# Patient Record
Sex: FEMALE | Race: WHITE
[De-identification: names, ages, dates, MRNs, and addresses within clinical notes are randomized per-mention and may not be internally consistent; named-entity substitution may affect disease eponyms.]

---

## 2017-03-05 ENCOUNTER — HOSPITAL ENCOUNTER (INPATIENT)
Dept: HOSPITAL 53 - M ED | Age: 82
LOS: 1 days | Discharge: ANOTHER HOSPITAL | DRG: 69 | End: 2017-03-06
Attending: HOSPITALIST | Admitting: HOSPITALIST
Payer: MEDICARE

## 2017-03-05 VITALS — BODY MASS INDEX: 32.37 KG/M2 | WEIGHT: 189.6 LBS | HEIGHT: 64 IN

## 2017-03-05 DIAGNOSIS — I44.2: ICD-10-CM

## 2017-03-05 DIAGNOSIS — Z79.82: ICD-10-CM

## 2017-03-05 DIAGNOSIS — Z79.84: ICD-10-CM

## 2017-03-05 DIAGNOSIS — E03.9: ICD-10-CM

## 2017-03-05 DIAGNOSIS — I21.4: ICD-10-CM

## 2017-03-05 DIAGNOSIS — Z79.891: ICD-10-CM

## 2017-03-05 DIAGNOSIS — Z79.4: ICD-10-CM

## 2017-03-05 DIAGNOSIS — I35.1: ICD-10-CM

## 2017-03-05 DIAGNOSIS — R26.81: ICD-10-CM

## 2017-03-05 DIAGNOSIS — E11.9: ICD-10-CM

## 2017-03-05 DIAGNOSIS — I10: ICD-10-CM

## 2017-03-05 DIAGNOSIS — F41.9: ICD-10-CM

## 2017-03-05 DIAGNOSIS — Z79.899: ICD-10-CM

## 2017-03-05 DIAGNOSIS — Z95.0: ICD-10-CM

## 2017-03-05 DIAGNOSIS — Z85.3: ICD-10-CM

## 2017-03-05 DIAGNOSIS — E78.5: ICD-10-CM

## 2017-03-05 DIAGNOSIS — G45.9: Primary | ICD-10-CM

## 2017-03-05 LAB
ANION GAP SERPL CALC-SCNC: 9 MEQ/L (ref 8–16)
BASOPHILS # BLD AUTO: 0 K/MM3 (ref 0–0.2)
BASOPHILS NFR BLD AUTO: 0.6 % (ref 0–1)
BUN SERPL-MCNC: 22 MG/DL (ref 7–18)
CALCIUM SERPL-MCNC: 9.4 MG/DL (ref 8.8–10.2)
CHLORIDE SERPL-SCNC: 104 MEQ/L (ref 98–107)
CO2 SERPL-SCNC: 29 MEQ/L (ref 21–32)
CREAT SERPL-MCNC: 1.24 MG/DL (ref 0.55–1.02)
EOSINOPHIL # BLD AUTO: 0.2 K/MM3 (ref 0–0.5)
EOSINOPHIL NFR BLD AUTO: 2.8 % (ref 0–3)
ERYTHROCYTE [DISTWIDTH] IN BLOOD BY AUTOMATED COUNT: 13.7 % (ref 11.5–14.5)
GFR SERPL CREATININE-BSD FRML MDRD: 44.3 ML/MIN/{1.73_M2} (ref 32–?)
GLUCOSE SERPL-MCNC: 163 MG/DL (ref 83–110)
INR PPP: 0.94
LARGE UNSTAINED CELL #: 0.1 K/MM3 (ref 0–0.4)
LARGE UNSTAINED CELL %: 1.1 % (ref 0–4)
LYMPHOCYTES # BLD AUTO: 1.1 K/MM3 (ref 1.5–4.5)
LYMPHOCYTES NFR BLD AUTO: 15.4 % (ref 24–44)
MCH RBC QN AUTO: 29.6 PG (ref 27–33)
MCHC RBC AUTO-ENTMCNC: 32.6 G/DL (ref 32–36.5)
MCV RBC AUTO: 90.8 FL (ref 80–96)
MONOCYTES # BLD AUTO: 0.3 K/MM3 (ref 0–0.8)
MONOCYTES NFR BLD AUTO: 3.6 % (ref 0–5)
NEUTROPHILS # BLD AUTO: 5.3 K/MM3 (ref 1.8–7.7)
NEUTROPHILS NFR BLD AUTO: 76.5 % (ref 36–66)
PLATELET # BLD AUTO: 200 K/MM3 (ref 150–450)
POTASSIUM SERPL-SCNC: 4.4 MEQ/L (ref 3.5–5.1)
SODIUM SERPL-SCNC: 142 MEQ/L (ref 136–145)
WBC # BLD AUTO: 6.9 K/MM3 (ref 4–10)

## 2017-03-05 RX ADMIN — Medication SCH MG: at 21:00

## 2017-03-05 NOTE — REPUSA
CLINICAL HISTORY: DYSARTHRIA.

TECHNIQUE: Multiple axial CT images were obtained through the brain without IV contrast material.

COMMENTS: 

There is normal configuration of sella turcica. There are no intra or extra-axial collections. There 
is no mass effect or midline shift. There is no evidence of hematoma formation. No hydrocephalus is p
resent. The ventricles are symmetrical. No abnormal calcifications are present. 

There is diffuse age-appropriate cerebellar and cerebral atrophy with proportionally dilated ventricl
es and cortical sulci. 

There are bilateral periventricular and subcortical white matter hypolucencies compatible with mild c
hronic microvascular disease.

Otherwise, no significant focal abnormalities are seen either in the posterior fossa or supratentoria
l compartment.

IMPRESSION:

1. Age-appropriate cerebellar and cerebral atrophy.

2. Mild chronic microvascular disease.

3. No evidence of acute intracranial pathology. 

Thank you for your kind referral of this patient.

 

     Electronically signed by ELISA SANDHU MD on 03/05/2017 08:08:30 PM ET

## 2017-03-06 VITALS — SYSTOLIC BLOOD PRESSURE: 131 MMHG | DIASTOLIC BLOOD PRESSURE: 73 MMHG

## 2017-03-06 VITALS — SYSTOLIC BLOOD PRESSURE: 116 MMHG | DIASTOLIC BLOOD PRESSURE: 63 MMHG

## 2017-03-06 VITALS — SYSTOLIC BLOOD PRESSURE: 125 MMHG | DIASTOLIC BLOOD PRESSURE: 63 MMHG

## 2017-03-06 VITALS — SYSTOLIC BLOOD PRESSURE: 130 MMHG | DIASTOLIC BLOOD PRESSURE: 72 MMHG

## 2017-03-06 VITALS — SYSTOLIC BLOOD PRESSURE: 138 MMHG | DIASTOLIC BLOOD PRESSURE: 74 MMHG

## 2017-03-06 VITALS — SYSTOLIC BLOOD PRESSURE: 152 MMHG | DIASTOLIC BLOOD PRESSURE: 79 MMHG

## 2017-03-06 LAB
ALBUMIN SERPL BCG-MCNC: 3.5 GM/DL (ref 3.2–5.2)
ALBUMIN/GLOB SERPL: 1.03 {RATIO} (ref 1–1.93)
ALP SERPL-CCNC: 115 U/L (ref 45–117)
ALT SERPL W P-5'-P-CCNC: 29 U/L (ref 12–78)
ANION GAP SERPL CALC-SCNC: 8 MEQ/L (ref 8–16)
AST SERPL-CCNC: 18 U/L (ref 15–37)
BASE EXCESS BLDA CALC-SCNC: 3.2 MMOL/L (ref -2–2)
BASOPHILS # BLD AUTO: 0 K/MM3 (ref 0–0.2)
BASOPHILS NFR BLD AUTO: 0.4 % (ref 0–1)
BILIRUB SERPL-MCNC: 0.5 MG/DL (ref 0.2–1)
BUN SERPL-MCNC: 17 MG/DL (ref 7–18)
CALCIUM SERPL-MCNC: 8.8 MG/DL (ref 8.8–10.2)
CHLORIDE SERPL-SCNC: 104 MEQ/L (ref 98–107)
CHOLEST SERPL-MCNC: 127 MG/DL (ref ?–200)
CO2 BLDA CALC-SCNC: 28.7 MEQ/L (ref 23–31)
CO2 SERPL-SCNC: 29 MEQ/L (ref 21–32)
CREAT SERPL-MCNC: 1.17 MG/DL (ref 0.55–1.02)
EOSINOPHIL # BLD AUTO: 0.1 K/MM3 (ref 0–0.5)
EOSINOPHIL NFR BLD AUTO: 2.2 % (ref 0–3)
ERYTHROCYTE [DISTWIDTH] IN BLOOD BY AUTOMATED COUNT: 13.8 % (ref 11.5–14.5)
GFR SERPL CREATININE-BSD FRML MDRD: 47.4 ML/MIN/{1.73_M2} (ref 32–?)
GLUCOSE SERPL-MCNC: 171 MG/DL (ref 83–110)
HCO3 BLDA-SCNC: 27.5 MEQ/L (ref 22–26)
HCO3 STD BLDA-SCNC: 27.2 MEQ/L (ref 22–26)
LARGE UNSTAINED CELL #: 0.1 K/MM3 (ref 0–0.4)
LARGE UNSTAINED CELL %: 1.2 % (ref 0–4)
LYMPHOCYTES # BLD AUTO: 1.1 K/MM3 (ref 1.5–4.5)
LYMPHOCYTES NFR BLD AUTO: 16.2 % (ref 24–44)
MAGNESIUM SERPL-MCNC: 2.3 MG/DL (ref 1.8–2.4)
MCH RBC QN AUTO: 29.6 PG (ref 27–33)
MCHC RBC AUTO-ENTMCNC: 32.5 G/DL (ref 32–36.5)
MCV RBC AUTO: 91.2 FL (ref 80–96)
MONOCYTES # BLD AUTO: 0.3 K/MM3 (ref 0–0.8)
MONOCYTES NFR BLD AUTO: 5 % (ref 0–5)
NEUTROPHILS # BLD AUTO: 5 K/MM3 (ref 1.8–7.7)
NEUTROPHILS NFR BLD AUTO: 75 % (ref 36–66)
PCO2 BLDA: 40.7 MMHG (ref 35–45)
PH BLDA: 7.45 UNITS (ref 7.35–7.45)
PLATELET # BLD AUTO: 191 K/MM3 (ref 150–450)
PO2 BLDA: 67 MMHG (ref 75–100)
POTASSIUM SERPL-SCNC: 4.4 MEQ/L (ref 3.5–5.1)
PROT SERPL-MCNC: 6.9 GM/DL (ref 6.4–8.2)
SODIUM SERPL-SCNC: 141 MEQ/L (ref 136–145)
TRIGL SERPL-MCNC: 109 MG/DL (ref ?–150)
WBC # BLD AUTO: 6.6 K/MM3 (ref 4–10)

## 2017-03-06 RX ADMIN — INSULIN LISPRO SCH UNITS: 100 INJECTION, SOLUTION INTRAVENOUS; SUBCUTANEOUS at 17:35

## 2017-03-06 RX ADMIN — GABAPENTIN SCH MG: 300 CAPSULE ORAL at 17:34

## 2017-03-06 RX ADMIN — Medication SCH MG: at 08:34

## 2017-03-06 RX ADMIN — POTASSIUM CHLORIDE SCH MEQ: 750 TABLET, EXTENDED RELEASE ORAL at 02:41

## 2017-03-06 RX ADMIN — INSULIN LISPRO SCH UNITS: 100 INJECTION, SOLUTION INTRAVENOUS; SUBCUTANEOUS at 08:35

## 2017-03-06 RX ADMIN — INSULIN LISPRO SCH UNITS: 100 INJECTION, SOLUTION INTRAVENOUS; SUBCUTANEOUS at 12:17

## 2017-03-06 RX ADMIN — GABAPENTIN SCH MG: 300 CAPSULE ORAL at 08:36

## 2017-03-06 RX ADMIN — POTASSIUM CHLORIDE SCH MEQ: 750 TABLET, EXTENDED RELEASE ORAL at 08:37

## 2017-03-06 RX ADMIN — GABAPENTIN SCH MG: 300 CAPSULE ORAL at 02:41

## 2017-03-06 NOTE — REPUSA
CLINICAL HISTORY: Transient ischemic attack.

COMMENTS:

Real time sonography with duplex Doppler of the carotid arteries bilaterally is performed without keri
or studies for comparison.

Bilateral atheromatous plaques of the common carotid arteries extending to the internal and external 
carotid arteries.

Normal peak systolic velocities are noted.

Antegrade flow in the vertebral arteries.

IMPRESSION:

Bilateral less than 50% narrowing of the internal carotid arteries.

Thank you for your kind referral of this patient.

     Electronically signed by WALTER LILLY MD on 03/06/2017 12:28:25 AM ET

## 2017-03-06 NOTE — HPE
DATE OF ADMISSION:  03/05/2017

 

REASON FOR ADMISSION:  Transient ischemic attack (TIA) like symptoms.

 

PRIMARY CARE PROVIDER:  Dr. Dixon.

 

HISTORY OF PRESENT ILLNESS:  The patient is an 81-year-old female with past

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

diabetes, hypertension, and aortic regurgitation who presented to the emergency

room with her daughter after she started having TIA-like symptoms.  The patient's

daughter was over at the house to bring her father into the hospital for

shortness of breath when she noted that her mom started to have an unsteady gait

and confused speech.  She checked her blood pressure and it was found to be

elevated.  She called the ambulance and brought her mother into the hospital.  In

the emergency room, the patient underwent a CT scan which showed age appropriate

cerebral and cerebellar atrophy, mild chronic microvascular disease, no evidence

of acute pathology.  Vascular ultrasound was also done and showed bilateral less

than 50% narrowing of the internal carotids.  Blood pressure on arrival was

slightly elevated at 161/79, but had improved to 140/71 on repeat.  The

hospitalist was called for the admission for observation for possible TIA-like

symptoms.  Upon my exam, the patient did not have any confusion at this time or

slurred speech.  She had no focal deficits.  Denied any chest pain or shortness

of breath.  Denied any nausea or vomiting.  Denied any diaphoresis or chills.

Denied any urinary or bowel incontinence.  Denied any paresthesias or tingling.

 

 

REVIEW OF SYSTEMS:  12-point review of systems was obtained, all of which was

negative except for those mentioned above.

 

PAST MEDICAL HISTORY:  Significant for third degree heart block, diabetes,

hypertension, aortic regurgitation, breast cancer a year ago, hyperlipidemia,

hypothyroidism, and anxiety.

 

PAST SURGICAL HISTORY:  Significant for pacemaker placement, bilateral knee

surgery and cataract surgery.

 

ALLERGIES:  CODEINE.

 

SOCIAL HISTORY:  The patient denies any alcohol or tobacco use.

 

HOME MEDICATIONS (include):

-  Tylenol 650 mg by mouth every 4 hours as needed for pain

-  Norvasc 10 mg by mouth daily

-  aspirin 81 mg by mouth daily

-  atorvastatin 40 mg by mouth at bedtime

-  chlorthalidone 12.5 mg by mouth daily

-  gabapentin 300 mg by mouth three times a day

-  glipizide 10 mg by mouth twice a day

-  Lantus 18 units subcutaneously at night

-  irbesartan 150 mg by mouth daily

-  lorazepam 0.5 mg by mouth twice a day

-  magnesium chloride 64 mg tablet by mouth twice a day

-  metformin 500 mg by mouth daily and 1000 mg by mouth at bedtime

-  multivitamin 1 tablet by mouth daily

-  paroxetine 20 mg by mouth daily

-  potassium chloride 10 mEq by mouth twice a day

-  spironolactone 25 mg by mouth daily

 

FAMILY HISTORY:  Noncontributory.

 

PHYSICAL EXAMINATION:

Vitals:  On admission, blood pressure was 161/79, temperature 97, pulse 76,

respiratory rate 18, pulse oximetry 97% on room air.

HEENT:  Pupils equal round and react to light and accommodation.  Neck supple.

No jugular venous distention (JVD).

Lungs:  Clear to auscultation (CTA) bilaterally.

Abdomen:  Soft, nontender, nondistended.

Extremities:  No clubbing, cyanosis or edema.

Neurologic:  Cranial nerves II-XII grossly intact.  No focal deficits.

 

LABORATORY FINDINGS:  WBC 6.9, hemoglobin 12.5, hematocrit 38.1, platelet count

200.  Sodium 142, potassium 4.4, chloride 104, BUN 22, creatinine 1.24, fasting

glucose 163, INR 0.94.  CT scan as above.  Carotid ultrasound as above.

 

ASSESSMENT/PLAN:

1.  Transient ischemic attack like symptoms which had resolved by the time the

patient arrived to the emergency room.  There is no documentation of any focal

deficits.  The patient is able to move arms and legs.  No signs of facial droop.

Speech appears to be normal.  We will continue to monitor on telemetry.  The

patient was unable to get a MRI due to patient having a pacemaker.  CT scan was

unremarkable.  We will order a morning lipid panel.  Continue neurologic checks

every 4 hours.

2.  History of hypertension.  We will resume the patient's home medications.

3.  History of lower extremity edema.  Will continue the patient's

spironolactone.

4.  History of diabetes.  Will start the patient on sliding scale with Accu-Cheks

before meals and at bedtime.  Consistent carbohydrate diet.

5.  History of hyperlipidemia.  Continue Lipitor 40 mg by mouth daily.

6.  History of anxiety.  Continue the patient's home medications.

7.  Deep vein thrombosis (DVT) prophylaxis.  Sequential compression devices

(SCDs) while in bed.

## 2017-03-06 NOTE — DSES
DATE OF ADMISSION:  03/05/2017

DATE OF DISCHARGE:

 

Patient is transferred to [facility] for possible cardiac

catheterization March 6, 2017.

 

PRIMARY CARE PROVIDER: Dr. Dixon

 

NEUROLOGIST: Dr. Adams

 

CARDIOLOGIST: Dr. De Jesus

 

FINAL DIAGNOSES:

Non-ST elevation myocardial infarction (NSTEMI).

Transient ischemic attack (TIA).

Hypertension.

History of lower extremity edema.

History of diabetes, type 2.

History of dyslipidemia.

Anxiety.

 

HISTORY OF PRESENT ILLNESS: This is an 81-year-old female patient with 
underlying

medical history of third-degree heart block with pacemaker, type 2 diabetes,

hypertension, aortic regurgitation, who presented to the emergency room with her

daughter after she was having TIA symptoms. The patient's daughter, who is a

nurse, was in the process of bringing her  to the hospital for shortness

of breath and had noticed that the patient's mother was unsteady and confused

with slurred speech, with also left arm weakness. Subsequently, called ambulance

and brought her mother into the hospital. The patient underwent CT scan, which

were negative other than some age-appropriate changes and cerebral atrophy and

microvascular changes. No evidence of acute pathology. Ultrasound Doppler of the

carotids shows bilateral less than 50% narrowing of internal carotid. Blood

pressure on arrival was 161/79. The patient has subsequently improved.

Hospitalist was called for admission of the patient to the hospital. The patient

was admitted to the hospital, given aspirin, and the patient's aspirin and 
statin

was continued. Echocardiogram was ordered. Neurologic check was observed. The

patient's lab was repeated and followed. The patient's blood pressure medication

was restarted. During the day today, the patient had an episode of increasing

lethargy and confusion again. At that time, blood pressure was 90/50 and the

patient was not tachycardic with heart rate of 64. The patient was alert and

oriented at that time but was just sleepy and drowsy. Subsequently, CT head was

repeated as well as given intravenous (IV) fluids and EKG was done showing

ventricular paced rhythm and cardiac enzyme was also sent. Cardiac enzyme

returned with a troponin of 2.99. Subsequently, the case was discussed with Dr. De Jesus. Given patient with diabetes and EKG showing ventricular (v) pace, the

patient could have a silent ischemic myocardial event, given

patient did admit with TIA, repeat CT scan was negative. Case was discussed with

Dr. De Jesus and Dr. Adams. Plavix loading of 150 mg was given and the patient 
was

placed on aspirin and Plavix and Lipitor, aspirin 81 mg, Plavix 75 mg and 
Lipitor

40 mg by mouth nightly.  [facility] was contacted for

transfer. The patient's blood pressure medication was also discontinued and

adjusted. The patient's blood pressure subsequently improved ,and the patient's

mental status returned to baseline. The decision was made that the patient 
should

be transferred for possible catheterization at [facility] and recommended by

Dr. De Jesus. Subsequently, arrangements are made for the patient to be

transferred to [facility].

 

VITAL SIGNS: Temperature 97.3, pulse 64, respirations 20, blood pressure 125/63,

pulse oximetry 95% on room air.

 

LABORATORY: WBC 6.6, hemoglobin and hematocrit 12.1 over 37.2, platelets 191.

Chemistry: Sodium 141, potassium 4.4, chloride 104, bicarbonate 29, BUN 17,

creatinine 1.17, total , troponin 2.99.

 

DISCHARGE MEDICATIONS:

- Coreg 6.25 mg by mouth twice a day

- Plavix 75 mg by mouth daily

- Avapro 75 mg by mouth daily, decreased from 150 mg

- acetaminophen 650 mg by mouth nightly

- aspirin 81 mg by mouth daily

- Lipitor 40 mg by mouth daily

- vitamin D one tablet by mouth daily

- gabapentin 300 mg by mouth three times a day

- glipizide 10 mg by mouth twice a day

- Lantus insulin 18 units subcu nightly

 

- The patient was on Ativan 0.5 mg by mouth twice a day at home.

- magnesium chloride 64 mg by mouth twice a day

- metformin 500 mg by mouth daily; metformin has been on hold given possibility

of cardiac catheterization

- multivitamin one tablet by mouth daily

- Paxil 20 mg by mouth daily

- potassium chloride 10 mEq by mouth twice a day

- spironolactone 25 mg by mouth daily

 

DISCHARGE INSTRUCTIONS: The patient was instructed to followup with Cardiology 
at

[facility] for further care, followup with telemetry monitoring, neurologic

checks, further care as per cardiology and [facility], followup with primary

care provider after discharge as well as cardiology after discharge and 
neurology

after discharge.

CHARLETTE

## 2017-03-06 NOTE — REP
CT HEAD WITHOUT CONTRAST:

 

HISTORY: TIAs.

 

COMPARISON: 03/05/2017

 

Areas of decreased attenuation are present in the periventricular white matter.

This represents small vessel ischemic disease. There is no intraparenchymal

hemorrhage, mass or midline shift. The ventricular system and cortical sulci are

dilated consistent with minimal volume loss. There is no extracerebral

collection. The visualized sinuses are clear.

 

IMPRESSION:

 

1. Small vessel ischemic disease.

 

2. Minimal volume loss.

 

 

Signed by

Brandon Guy MD 03/06/2017 03:37 P

## 2017-03-06 NOTE — ECGEPIP
Stationary ECG Study

                              [facility]

                                       

                                       Test Date:    2017

Pat Name:     ELMA POSADAS             Department:   

Patient ID:   [id number]                 Room:         [place] [address]

Gender:       F                        Technician:   TREY

:          1935               Requested By: DEWEY MARROQUIN 

Order Number: [id number]     Reading MD:   Kaity Hanson

                                 Measurements

Intervals                              Axis          

Rate:         62                       P:            58

MO:           164                      QRS:          -89

QRSD:         194                      T:            44

QT:           499                                    

QTc:          510                                    

                           Interpretive Statements

ELECTRONIC VENTRICULAR PACEMAKER NSR

ABNORMAL RHYTHM ECG

MORE V PACING   NO ATRIAL PACING C/W 4/8/15

Electronically Signed On 3-6-2017 17:15:48 EST by Kaity Hanson

## 2017-03-06 NOTE — IPN
DATE:  03/06/2017

 

Time patient was seen was at 9 a.m.

 

Patient has been seen and examined at bedside.  No acute event overnight.

Patient, however does have tachycardia on the event strips.  Patient, herself

feels better.  Denies any chest pain, trouble breathing, abdominal pains, nausea
,

vomiting, diarrhea, or constipation.  Denies any weakness on any side of her

body.  Admits to numbness at her heels, which was chronic.  Denies any changes 
in

vision, smell, hearing or taste.

 

PHYSICAL EXAMINATION:

VITAL SIGNS:  Temperature 97.6, pulse 66, respirations 18, blood pressure 138/74
,

and oxygen was satting 95% on room air.

GENERAL:  Patient is a pleasant, elderly female who was alert, awake, oriented

times three, does not appear to be in distress, resting comfortably in bed with

head elevated at 45 degree angle.

HEENT:  Normocephalic, atraumatic.  Extraocular motor intact.  Mucosa moist.

NECK:  Supple.  No neck lymphadenopathy.

CARDIOVASCULAR:  Regular rate and rhythm.  S1, S2.  Patient does have a 3/6

systolic heart murmur.

LUNGS:  Clear to auscultate bilaterally.  No wheeze, rales or rhonchi.

ABDOMEN:  Positive bowel sounds.  Soft.  Nontender.  Nondistended.  No 
peritoneal

signs.  No ecchymosis.

EXTREMITIES:  No edema, clubbing or cyanosis.

SKIN:  Warm and dry.

NEUROLOGIC:  Cranial nerves II-XII intact.  Finger-to-nose was somewhat sluggish

on both side.  However, there is no change of sensation.  Muscle strength was 5/
5

in bilateral upper and lower extremity.

PSYCHIATRIC:  Normal mood and affect.

 

LABORATORY DATA:  WBC 6.6, hemoglobin 12.1, hematocrit is 37.2, with platelet

count of 191 and MCV of 91.2.

 

Sodium 141, potassium 4.4, chloride 104, bicarbonate 29, anion gap 8, BUN 17,

creatinine 1.17, GFR 47.4, fasting glucose 171, calcium 8.8, magnesium 2.3, AST

18, ALT 29, alkaline phosphatase 115, total protein 6.9, albumin 3.5,

triglyceride 109, cholesterol 127, LDL 61.2, HDL 44.

 

Patient's PT yesterday was 12.7, INR 0.94, PTT 28.1.

 

Accu-Chek glucose last night was 168.

 

Patient does have a carotid duplex ultrasound.  It shows bilateral less than 50%

narrowing of internal carotid artery.  Patient had a CT head without contrast

yesterday at 7:20.  It shows age-appropriate cerebellar and cerebral palsy.  
Mild

chronic microvascular disease.  No evidence of acute intracranial pathology.

 

ASSESSMENT AND PLAN:  81-year-old female with past medical history of third

degree heart block, diabetes, hypertension, aortic regurgitation, breast cancer 
a

year ago, hyperlipidemia, hypothyroidism, anxiety, pacemaker, presented with:

 

1.  Transient ischemic attack (TIA).  Resolved by the time patient arrived

patient arrived in the emergency room (ER).  Patient does not have any

neurological changes this morning.  CT scan of the head was unremarkable last

night.  Patient was unable to have MRI due to pacemaker.  Patient's repeat head

scan has been ordered for tomorrow.  Patient's lipid panel was within normal

limits.  Continue neuro checks.  We will also order an echo, physical therapy

(PT), occupational therapy (OT) and continue to monitor patient.

 

2.  History of hypertension.  Will continue patient's home medications.

 

3.  History of lower extremity edema.  Continue spironolactone.

 

4.  History of diabetes.  Continue patient on sliding scale with Accu-Cheks

before meals and at bedtime with consistent carbohydrate diet.

 

5.  Hyperlipidemia.  Continue Lipitor.

 

6.  History of anxiety.  Continue home medication.

 

7.  Deep vein thrombosis (DVT) prophylaxis.  Sequential compression devices

(SCDs).  Will repeat CT head tomorrow.

 

DISPOSITION:  Will followup with repeat CT head.  Will followup with echo and PT
,

OT and continue to monitor patient.  Continue neuro check.

 

Patient has been discussed with attending doctor, Dr. Montoya.

 

My preceptor for this patient encounter was Suze Montoya MD.  The preceptor was

physically present in the building during the encounter and was fully available.

As needed, all aspects of the patient interview, examination, medical decision

making process, and medical care plan development were reviewed and approved by

the preceptor.  The preceptor is aware and concurs with the plan as stated in 
the

body of this note and will attest to such by his/her co-signature.



Addendum: 

Patient was found to be suddenly hypotensive later in the afternoon. Dr. Montoya 
ordered Cardiac markers and found patient to have elevated troponin 2.9. 

Patient was then emergently transferred out for the NSTEMI



I have both independently examined this patient as well as reviewed the note.  
I have discussed in detail with the resident the findings and plan of treatment 
as documented in the residents note. I will continue to follow the patient and 
offer further guidance to the patients care as necessary during this hospital 
stay.



Suze OVALLES

## 2017-03-06 NOTE — ECGEPIP
Stationary ECG Study

                           [facility]

                                       

                                       Test Date:    2017

Pat Name:     ELMA POSADAS             Department:   

Patient ID:   [id number]                 Room:         [place] [address]

Gender:       F                        Technician:   larissa

:          1935               Requested By: PRABHA GRANDE 

Order Number: [id number]     Reading MD:   Lazaro Lopez

                                 Measurements

Intervals                              Axis          

Rate:         70                       P:            85

WV:           170                      QRS:          -89

QRSD:         194                      T:            37

QT:           468                                    

QTc:          508                                    

                           Interpretive Statements

ELECTRONIC VENTRICULAR PACEMAKER

ABNORMAL RHYTHM ECG

 

Electronically Signed On 3-6-2017 20:30:05 EST by Lazaro Lopez

## 2017-03-07 NOTE — CR
DATE OF CONSULTATION: 2017

 

REFERRING PHYSICIAN: Dr. Montoya.

 

HISTORY: The patient is an 81-year-old female who was admitted to the hospital

secondary to complaints of unsteady gait associated with mumbled speech.

According to the history available, the patient's daughter was at her house to

bring her father into the hospital because of complaints of shortness of breath.

When she was at her mother's home, she noted that she suddenly became unsteady on

her feet and her speech became mumbled.  Due to this, emergency medical

technicians (EMT) was called in and she was brought to the emergency room (ER)

here at the [facility] where she had a CT scan of the brain

performed which did not show any acute bleed or infarct.  She was continued on

her aspirin a day.  According to the patient, her symptoms started at about 04:30

p.m. yesterday.  She got up and noted that she had difficulty walking.  Her

speech was also slightly mumbled at that time.  She developed a throbbing

headache behind her right eye. She also felt that her left arm and leg where

heavy and mildly weak.  She however does not recall having any dizzy spells,

vertigo, diplopia or blurred vision.  She did not have any neck pain at that time

but she did have pain in the left shoulder which is chronic.  Her symptoms

persisted till about 07:00 p.m. after which she felt better.  Her headache has

resolved.  She denies any heaviness in her left arm or leg.  There is also no

complaint of difficulty walking at the present time.

 

Her repeat CT scan of the brain was performed this afternoon which once again did

not show any evidence of acute bleed or infarct.  This study did show mild to

moderate small vessel ischemic disease.

 

Her carotid ultrasound shows less than 50% stenosis bilaterally.

 

Her CPK was noted to be elevated this evening at 2.88.  Due to this, she is being

transferred to [facility] in [place].

 

MEDICATIONS: Her medications prior to admission here included:

- Norvasc

- aspirin 81 mg daily

- atorvastatin

- chlorthalidone

- Neurontin 300  mg three times a day

- Glipizide, insulin

- lorazepam 0.5 mg twice a day

- magnesium chloride

- metformin

- Paxil 20 mg daily

- multivitamin supplements

- potassium

 

PAST MEDICAL HISTORY:

The patient has known history of:

1. Hypertension.

2. Coronary artery disease.

3. Third degree heart block.

4. Status post pacemaker.

5. Diabetes.

6. Unsteady gait.

 

FAMILY HISTORY: The patient's family history is not available, although her

mother and father are both .  According to the patient, her mother did

have problems with her heart and she also had hypertension.

 

PERSONAL AND SOCIAL HISTORY: The patient lives with her .  There is no

past medical history of smoking, alcohol and drug abuse.

 

All other systems were reviewed and found to be noncontributory.

 

PHYSICAL EXAMINATION:

GENERAL: On examination the patient does not appear in any discomfort.  She is

pleasant to interact with.  Her posture is normal.

VITAL SIGNS: Her blood pressure is 130/70.  Pulse is 65 per minute.  Respirations

are 18 per minute.  Her temperature is 98.4 degrees Fahrenheit.  She is 5 feet 4

inches tall.  She weighs about 86 kg.

NECK: Her neck is supple.  There is no carotid bruit audible.  She does not have

any tenderness in her cervical spine or shoulder muscles.

HEENT: Her ear, nose and throat examination is normal.

LUNGS: Her lungs are clear to auscultation.

HEART: Her heart is regular rhythm.

ABDOMEN: Her abdomen is soft and nontender.

EXTREMITIES: There is no ankle edema seen.  The peripheral pulses are normally

palpable.

NEUROLOGIC: She is oriented to time and place.  Her speech is fluent.

Extraocular movements are intact.  There is no horizontal or vertical nystagmus

seen.  Her pupils are about 3 mm in size and reactive to light.  The consensual

light reflex is present bilaterally.  Visual fields are full to confrontational

testing.  Her motor examination does not show any focal motor weakness in her

upper or lower extremities except she has restricted range of motion in her left

shoulder joint.  There are no resting or postural tremors of the hands seen.  Her

muscle tone is normal.  The sensation to fine touch and pinprick is equal on both

sides of the body.  Deep tendon reflexes are 2+ and symmetrical with equivocal

plantar reflexes.  Her gait is not tested.

 

DIAGNOSTIC STUDIES:

The patient's CT scan of the brain performed twice since yesterday does not show

any evidence of acute bleed or infarct.  This study does show mild small vessel

ischemic disease.  There is also some associated generalized atrophy.

 

The carotid duplex study shows less than 50% stenosis bilaterally.

 

Her CBC shows white cell count of 6900, hemoglobin 12.5, hematocrit 38.1 and

platelets 200,000.

 

 

ASSESSMENT

1. Episode of unsteady gait.

2. Mild hemiparesis with heaviness.

3. Likely transient ischemic attack (TIA) in the right internal carotid artery

(ICA) distribution.

 

PLAN:

1.  Vasculitis workup.

2.  Aspirin 81 mg by mouth daily.

3.  Plavix 75 mg by mouth daily.

4.  Physical therapy/occupational therapy (PT/OT)evaluation and treatment.

5.  Continue with other medications.

 

Thank you very much for this consultation.

 

 

 

 

cc:    Suze Montoya MD

## 2017-03-07 NOTE — ECHO
DATE OF PROCEDURE: 03/06/2017

 

REFERRING PHYSICIAN: Dr. Jose Dumont

 

INDICATION: Transient ischemic attack (transient cerebral ischemia unspecified).

 

HEIGHT: 157 cm.

WEIGHT: 82 kg.

 

MEASUREMENTS:

Left atrium: 4.3 cm

Ventricular septum: 1.59 cm

Posterior wall: 1.60 cm

Left ventricle diastole: 3.6 cm

Aortic root: 2.8 cm

LVOT: 1.7 cm

Inferior vena cava: 1.6 cm

 

DOPPLER MEASUREMENTS:

Moderate aortic stenosis.

Very mild aortic regurgitation.

Peak aortic gradient: 28 mmHg

Mean aortic valve gradient: 18 mmHg

Aortic valve velocity: 263 cm/s

Aortic valve VTI: 62.3 cm

Aortic valve area (VTI): 1.07 cm2

LVOT velocity: 136 cm/s

LVOT VTI: 29.4 cm

Mild mitral regurgitation.

Mild mitral stenosis.

Mitral E velocity (CW): 1.87 cm/s

Mean aortic valve gradient: 7 mmHg

Mild tricuspid regurgitation.

Estimated right ventricle systolic pressure 38 mmHg assuming an atrial pressure

of 5 mmHg.

 

DESCRIPTION: Rhythm was AV sequential paced rhythm and at times probably sinus

rhythm with P-synchronous ventricular pacing. This was a moderately technically

difficult echocardiogram.  No pericardial effusion.

 

CONCLUSIONS:

1.  Degenerative, calcific aortic valve disease with moderate aortic stenosis and

mild aortic regurgitation.

2.  Severe mitral annular calcification with mild mitral stenosis and mild mitral

regurgitation.

3.  Moderately-severe concentric left ventricular hypertrophy with mild reduction

in left ventricle cavity size.  No regional wall motion abnormalities.  No

paradoxical septal motion despite ventricular pacing.  Normal LV systolic

function.  LVEF 70% by visual estimate.  Unable to assess LV diastolic function

in the setting of severe mitral annular calcification with mitral stenosis.

4.  Mild left atrial dilatation.

5.  Suggestive of mild elevation of estimated right ventricle systolic pressure.

 

6.  Presence of endocardial right ventricular lead and probably right atrial

lead.

## 2017-04-04 ENCOUNTER — HOSPITAL ENCOUNTER (OUTPATIENT)
Dept: HOSPITAL 53 - SKLAB2 | Age: 82
End: 2017-04-04
Attending: INTERNAL MEDICINE

## 2017-04-04 DIAGNOSIS — E11.9: Primary | ICD-10-CM

## 2017-04-04 LAB
ANION GAP SERPL CALC-SCNC: 9 MEQ/L (ref 8–16)
BUN SERPL-MCNC: 12 MG/DL (ref 7–18)
CALCIUM SERPL-MCNC: 9 MG/DL (ref 8.8–10.2)
CHLORIDE SERPL-SCNC: 104 MEQ/L (ref 98–107)
CO2 SERPL-SCNC: 27 MEQ/L (ref 21–32)
CREAT SERPL-MCNC: 1.29 MG/DL (ref 0.55–1.02)
ERYTHROCYTE [DISTWIDTH] IN BLOOD BY AUTOMATED COUNT: 14.2 % (ref 11.5–14.5)
EST. AVERAGE GLUCOSE BLD GHB EST-MCNC: 157 MG/DL (ref 60–110)
GFR SERPL CREATININE-BSD FRML MDRD: 42.3 ML/MIN/{1.73_M2} (ref 32–?)
GLUCOSE SERPL-MCNC: 242 MG/DL (ref 83–110)
MAGNESIUM SERPL-MCNC: 1.9 MG/DL (ref 1.8–2.4)
MCH RBC QN AUTO: 29.9 PG (ref 27–33)
MCHC RBC AUTO-ENTMCNC: 32.3 G/DL (ref 32–36.5)
MCV RBC AUTO: 92.4 FL (ref 80–96)
PLATELET # BLD AUTO: 201 K/MM3 (ref 150–450)
POTASSIUM SERPL-SCNC: 3.9 MEQ/L (ref 3.5–5.1)
SODIUM SERPL-SCNC: 140 MEQ/L (ref 136–145)
WBC # BLD AUTO: 4.7 K/MM3 (ref 4–10)

## 2018-10-26 ENCOUNTER — HOSPITAL ENCOUNTER (INPATIENT)
Dept: HOSPITAL 53 - M ED | Age: 83
LOS: 2 days | Discharge: HOME HEALTH | DRG: 291 | End: 2018-10-28
Attending: INTERNAL MEDICINE | Admitting: HOSPITALIST
Payer: MEDICARE

## 2018-10-26 DIAGNOSIS — Z88.8: ICD-10-CM

## 2018-10-26 DIAGNOSIS — I50.31: ICD-10-CM

## 2018-10-26 DIAGNOSIS — Z88.2: ICD-10-CM

## 2018-10-26 DIAGNOSIS — E78.5: ICD-10-CM

## 2018-10-26 DIAGNOSIS — I25.2: ICD-10-CM

## 2018-10-26 DIAGNOSIS — I11.0: Primary | ICD-10-CM

## 2018-10-26 DIAGNOSIS — E11.9: ICD-10-CM

## 2018-10-26 DIAGNOSIS — Z96.653: ICD-10-CM

## 2018-10-26 DIAGNOSIS — Z95.0: ICD-10-CM

## 2018-10-26 DIAGNOSIS — F41.9: ICD-10-CM

## 2018-10-26 DIAGNOSIS — E03.9: ICD-10-CM

## 2018-10-26 DIAGNOSIS — Z98.49: ICD-10-CM

## 2018-10-26 DIAGNOSIS — Z85.3: ICD-10-CM

## 2018-10-26 DIAGNOSIS — E83.42: ICD-10-CM

## 2018-10-26 DIAGNOSIS — Z79.899: ICD-10-CM

## 2018-10-26 DIAGNOSIS — E66.9: ICD-10-CM

## 2018-10-26 DIAGNOSIS — Z88.5: ICD-10-CM

## 2018-10-26 DIAGNOSIS — Z79.4: ICD-10-CM

## 2018-10-26 LAB
ABG BASE EXCESS: -4.3 (ref -2–2)
ABG HCO3: 20.5 MEQ/L (ref 22–26)
ABG O2 SATURATION: 98.6 % (ref 95–99)
ABG PARTIAL PRESSURE CO2: 36.9 MMHG (ref 35–45)
ABG PARTIAL PRESSURE O2: 134.1 MMHG (ref 75–100)
ABG PH (ARTERIAL): 7.36 UNITS (ref 7.35–7.45)
ABG STANDARD HCO3: 20.9 MEQ/L (ref 22–26)
ABG TOTAL CO2: 21.7 MEQ/L (ref 23–31)
ALBUMIN/GLOBULIN RATIO: 1.15 (ref 1–1.93)
ALBUMIN: 3.8 GM/DL (ref 3.2–5.2)
ALKALINE PHOSPHATASE: 110 U/L (ref 45–117)
ALT SERPL W P-5'-P-CCNC: 28 U/L (ref 12–78)
ANION GAP: 13 MEQ/L (ref 8–16)
ANION GAP: 9 MEQ/L (ref 8–16)
AST SERPL-CCNC: 13 U/L (ref 7–37)
BASO #: 0 10^3/UL (ref 0–0.2)
BASO #: 0 10^3/UL (ref 0–0.2)
BASO %: 0.2 % (ref 0–1)
BASO %: 0.2 % (ref 0–1)
BILIRUB CONJ SERPL-MCNC: 0.1 MG/DL (ref 0–0.2)
BILIRUBIN,TOTAL: 0.5 MG/DL (ref 0.2–1)
BLOOD UREA NITROGEN: 25 MG/DL (ref 7–18)
BLOOD UREA NITROGEN: 30 MG/DL (ref 7–18)
CALCIUM LEVEL: 8.8 MG/DL (ref 8.8–10.2)
CALCIUM LEVEL: 9 MG/DL (ref 8.8–10.2)
CARBON DIOXIDE LEVEL: 22 MEQ/L (ref 21–32)
CARBON DIOXIDE LEVEL: 29 MEQ/L (ref 21–32)
CHLORIDE LEVEL: 105 MEQ/L (ref 98–107)
CHLORIDE LEVEL: 105 MEQ/L (ref 98–107)
CK MB CFR.DF SERPL CALC: 2.17
CK MB CFR.DF SERPL CALC: 2.47
CK SERPL-CCNC: 154 U/L (ref 26–192)
CK SERPL-CCNC: 221 U/L (ref 26–192)
CK-MB VALUE MASS: 4 NG/ML (ref ?–3.6)
CK-MB VALUE MASS: 5 NG/ML (ref ?–3.6)
CREATININE FOR GFR: 1.21 MG/DL (ref 0.55–1.3)
CREATININE FOR GFR: 1.52 MG/DL (ref 0.55–1.3)
CRP SERPL-MCNC: 0.69 MG/DL (ref 0–0.3)
EOS #: 0 10^3/UL (ref 0–0.5)
EOS #: 0.1 10^3/UL (ref 0–0.5)
EOSINOPHIL NFR BLD AUTO: 0.1 % (ref 0–3)
EOSINOPHIL NFR BLD AUTO: 0.7 % (ref 0–3)
ERYTHROCYTE SEDIMENTATION RATE: 19 MM/HR (ref 0–30)
EST. AVERAGE GLUCOSE BLD GHB EST-MCNC: 157 MG/DL (ref 60–110)
GFR SERPL CREATININE-BSD FRML MDRD: 34.9 ML/MIN/{1.73_M2} (ref 32–?)
GFR SERPL CREATININE-BSD FRML MDRD: 45.4 ML/MIN/{1.73_M2} (ref 32–?)
GLUCOSE BLDC GLUCOMTR-MCNC: 263 MG/DL (ref 83–110)
GLUCOSE BLDC GLUCOMTR-MCNC: 280 MG/DL (ref 83–110)
GLUCOSE BLDC GLUCOMTR-MCNC: 421 MG/DL (ref 83–110)
GLUCOSE BLDC GLUCOMTR-MCNC: 430 MG/DL (ref 83–110)
GLUCOSE, FASTING: 242 MG/DL (ref 70–100)
GLUCOSE, FASTING: 393 MG/DL (ref 70–100)
HEMATOCRIT: 35.9 % (ref 36–47)
HEMATOCRIT: 40.6 % (ref 36–47)
HEMOGLOBIN: 11.7 G/DL (ref 12–15.5)
HEMOGLOBIN: 12.9 G/DL (ref 12–15.5)
IMMATURE GRANULOCYTE %: 0.3 % (ref 0–3)
IMMATURE GRANULOCYTE %: 0.6 % (ref 0–3)
INR: 0.96
LACTIC ACID SEPSIS PROTOCOL: 2.4 MMOL/L (ref 0.4–2)
LYMPH #: 0.4 10^3/UL (ref 1.5–4.5)
LYMPH #: 1.6 10^3/UL (ref 1.5–4.5)
LYMPH %: 4 % (ref 24–44)
LYMPH %: 9.1 % (ref 24–44)
MAGNESIUM LEVEL: 1.7 MG/DL (ref 1.8–2.4)
MEAN CORPUSCULAR HEMOGLOBIN: 30.6 PG (ref 27–33)
MEAN CORPUSCULAR HEMOGLOBIN: 30.9 PG (ref 27–33)
MEAN CORPUSCULAR HGB CONC: 31.8 G/DL (ref 32–36.5)
MEAN CORPUSCULAR HGB CONC: 32.6 G/DL (ref 32–36.5)
MEAN CORPUSCULAR VOLUME: 94.7 FL (ref 80–96)
MEAN CORPUSCULAR VOLUME: 96.2 FL (ref 80–96)
MONO #: 0.1 10^3/UL (ref 0–0.8)
MONO #: 0.5 10^3/UL (ref 0–0.8)
MONO %: 1.3 % (ref 0–5)
MONO %: 3 % (ref 0–5)
NEUTROPHILS #: 10.2 10^3/UL (ref 1.8–7.7)
NEUTROPHILS #: 15.1 10^3/UL (ref 1.8–7.7)
NEUTROPHILS %: 86.4 % (ref 36–66)
NEUTROPHILS %: 94.1 % (ref 36–66)
NRBC BLD AUTO-RTO: 0 % (ref 0–0)
NRBC BLD AUTO-RTO: 0 % (ref 0–0)
NT-PRO BNP: 1016 PG/ML (ref ?–450)
NT-PRO BNP: 4009 PG/ML (ref ?–450)
PLATELET COUNT, AUTOMATED: 213 10^3/UL (ref 150–450)
PLATELET COUNT, AUTOMATED: 231 10^3/UL (ref 150–450)
POTASSIUM SERUM: 3.7 MEQ/L (ref 3.5–5.1)
POTASSIUM SERUM: 4.2 MEQ/L (ref 3.5–5.1)
PROTHROMBIN TIME: 12.9 SECONDS (ref 12.1–14.4)
RED BLOOD COUNT: 3.79 10^6/UL (ref 4–5.4)
RED BLOOD COUNT: 4.22 10^6/UL (ref 4–5.4)
RED CELL DISTRIBUTION WIDTH: 14.6 % (ref 11.5–14.5)
RED CELL DISTRIBUTION WIDTH: 14.6 % (ref 11.5–14.5)
SODIUM LEVEL: 140 MEQ/L (ref 136–145)
SODIUM LEVEL: 143 MEQ/L (ref 136–145)
TOTAL PROTEIN: 7.1 GM/DL (ref 6.4–8.2)
TROPONIN I: 0.24 NG/ML (ref ?–0.1)
TROPONIN I: 0.24 NG/ML (ref ?–0.1)
TROPONIN I: 0.84 NG/ML (ref ?–0.1)
WHITE BLOOD COUNT: 10.9 10^3/UL (ref 4–10)
WHITE BLOOD COUNT: 17.4 10^3/UL (ref 4–10)

## 2018-10-26 RX ADMIN — IPRATROPIUM BROMIDE AND ALBUTEROL SULFATE 1 ML: .5; 3 SOLUTION RESPIRATORY (INHALATION) at 03:35

## 2018-10-26 RX ADMIN — INSULIN LISPRO 14 UNITS: 100 INJECTION, SOLUTION INTRAVENOUS; SUBCUTANEOUS at 12:20

## 2018-10-26 RX ADMIN — SODIUM CHLORIDE, PRESERVATIVE FREE 1 ML: 5 INJECTION INTRAVENOUS at 20:37

## 2018-10-26 RX ADMIN — IPRATROPIUM BROMIDE AND ALBUTEROL SULFATE 1 ML: .5; 3 SOLUTION RESPIRATORY (INHALATION) at 03:55

## 2018-10-26 RX ADMIN — GABAPENTIN 1 MG: 300 CAPSULE ORAL at 20:35

## 2018-10-26 RX ADMIN — DEXTROSE MONOHYDRATE 1 MG: 50 INJECTION, SOLUTION INTRAVENOUS at 03:15

## 2018-10-26 RX ADMIN — NITROGLYCERIN 1 GM: 20 OINTMENT TOPICAL at 04:07

## 2018-10-26 RX ADMIN — INSULIN LISPRO 8 UNITS: 100 INJECTION, SOLUTION INTRAVENOUS; SUBCUTANEOUS at 18:28

## 2018-10-26 RX ADMIN — SERTRALINE HYDROCHLORIDE 1 MG: 50 TABLET ORAL at 20:34

## 2018-10-26 RX ADMIN — ENOXAPARIN SODIUM 1 MG: 30 INJECTION SUBCUTANEOUS at 08:17

## 2018-10-26 RX ADMIN — MULTIPLE VITAMINS W/ MINERALS TAB 1 TAB: TAB at 08:18

## 2018-10-26 RX ADMIN — Medication 1 MG: at 10:35

## 2018-10-26 RX ADMIN — INSULIN DETEMIR 1 UNITS: 100 INJECTION, SOLUTION SUBCUTANEOUS at 20:36

## 2018-10-26 RX ADMIN — GLIPIZIDE 1 MG: 5 TABLET ORAL at 08:18

## 2018-10-26 RX ADMIN — POTASSIUM CHLORIDE 1 MEQ: 750 TABLET, EXTENDED RELEASE ORAL at 08:18

## 2018-10-26 RX ADMIN — METOPROLOL SUCCINATE 1 MG: 25 TABLET, EXTENDED RELEASE ORAL at 16:14

## 2018-10-26 RX ADMIN — POTASSIUM CHLORIDE 1 MEQ: 750 TABLET, EXTENDED RELEASE ORAL at 20:34

## 2018-10-26 RX ADMIN — Medication 1 MG: at 20:34

## 2018-10-26 RX ADMIN — INSULIN LISPRO 2 UNITS: 100 INJECTION, SOLUTION INTRAVENOUS; SUBCUTANEOUS at 20:36

## 2018-10-26 RX ADMIN — IPRATROPIUM BROMIDE AND ALBUTEROL SULFATE 1 ML: .5; 3 SOLUTION RESPIRATORY (INHALATION) at 04:12

## 2018-10-26 RX ADMIN — GABAPENTIN 1 MG: 300 CAPSULE ORAL at 08:17

## 2018-10-26 RX ADMIN — ATORVASTATIN CALCIUM 1 MG: 20 TABLET, FILM COATED ORAL at 20:35

## 2018-10-26 RX ADMIN — SODIUM CHLORIDE, PRESERVATIVE FREE 1 ML: 5 INJECTION INTRAVENOUS at 22:00

## 2018-10-26 RX ADMIN — CLOPIDOGREL BISULFATE 1 MG: 75 TABLET ORAL at 08:18

## 2018-10-26 RX ADMIN — MAGNESIUM SULFATE IN DEXTROSE 1 MLS/HR: 10 INJECTION, SOLUTION INTRAVENOUS at 15:43

## 2018-10-26 RX ADMIN — FUROSEMIDE 1 MG: 10 INJECTION, SOLUTION INTRAMUSCULAR; INTRAVENOUS at 18:38

## 2018-10-26 RX ADMIN — INSULIN LISPRO 14 UNITS: 100 INJECTION, SOLUTION INTRAVENOUS; SUBCUTANEOUS at 09:20

## 2018-10-27 LAB
ANION GAP: 9 MEQ/L (ref 8–16)
BLOOD UREA NITROGEN: 36 MG/DL (ref 7–18)
CALCIUM LEVEL: 8.8 MG/DL (ref 8.8–10.2)
CARBON DIOXIDE LEVEL: 31 MEQ/L (ref 21–32)
CHLORIDE LEVEL: 101 MEQ/L (ref 98–107)
CREATININE FOR GFR: 1.38 MG/DL (ref 0.55–1.3)
GFR SERPL CREATININE-BSD FRML MDRD: 39 ML/MIN/{1.73_M2} (ref 32–?)
GLUCOSE BLDC GLUCOMTR-MCNC: 135 MG/DL (ref 83–110)
GLUCOSE BLDC GLUCOMTR-MCNC: 155 MG/DL (ref 83–110)
GLUCOSE BLDC GLUCOMTR-MCNC: 319 MG/DL (ref 83–110)
GLUCOSE, FASTING: 217 MG/DL (ref 70–100)
HEMATOCRIT: 33.8 % (ref 36–47)
HEMOGLOBIN: 11.1 G/DL (ref 12–15.5)
MAGNESIUM LEVEL: 2 MG/DL (ref 1.8–2.4)
MEAN CORPUSCULAR HEMOGLOBIN: 30 PG (ref 27–33)
MEAN CORPUSCULAR HGB CONC: 32.8 G/DL (ref 32–36.5)
MEAN CORPUSCULAR VOLUME: 91.4 FL (ref 80–96)
NRBC BLD AUTO-RTO: 0 % (ref 0–0)
PLATELET COUNT, AUTOMATED: 226 10^3/UL (ref 150–450)
POTASSIUM SERUM: 3.5 MEQ/L (ref 3.5–5.1)
RED BLOOD COUNT: 3.7 10^6/UL (ref 4–5.4)
RED CELL DISTRIBUTION WIDTH: 14.6 % (ref 11.5–14.5)
SODIUM LEVEL: 141 MEQ/L (ref 136–145)
WHITE BLOOD COUNT: 15.8 10^3/UL (ref 4–10)

## 2018-10-27 RX ADMIN — GABAPENTIN 1 MG: 300 CAPSULE ORAL at 20:58

## 2018-10-27 RX ADMIN — SODIUM CHLORIDE, PRESERVATIVE FREE 1 ML: 5 INJECTION INTRAVENOUS at 22:00

## 2018-10-27 RX ADMIN — METOPROLOL SUCCINATE 1 MG: 25 TABLET, EXTENDED RELEASE ORAL at 08:35

## 2018-10-27 RX ADMIN — SODIUM CHLORIDE, PRESERVATIVE FREE 1 ML: 5 INJECTION INTRAVENOUS at 12:13

## 2018-10-27 RX ADMIN — INSULIN LISPRO 1 UNITS: 100 INJECTION, SOLUTION INTRAVENOUS; SUBCUTANEOUS at 20:59

## 2018-10-27 RX ADMIN — GABAPENTIN 1 MG: 300 CAPSULE ORAL at 08:34

## 2018-10-27 RX ADMIN — ATORVASTATIN CALCIUM 1 MG: 20 TABLET, FILM COATED ORAL at 20:58

## 2018-10-27 RX ADMIN — ENOXAPARIN SODIUM 1 MG: 30 INJECTION SUBCUTANEOUS at 08:34

## 2018-10-27 RX ADMIN — INSULIN LISPRO 6 UNITS: 100 INJECTION, SOLUTION INTRAVENOUS; SUBCUTANEOUS at 08:34

## 2018-10-27 RX ADMIN — Medication 1 MG: at 20:59

## 2018-10-27 RX ADMIN — SERTRALINE HYDROCHLORIDE 1 MG: 50 TABLET ORAL at 20:58

## 2018-10-27 RX ADMIN — FUROSEMIDE 1 MG: 10 INJECTION, SOLUTION INTRAMUSCULAR; INTRAVENOUS at 00:13

## 2018-10-27 RX ADMIN — INSULIN DETEMIR 1 UNITS: 100 INJECTION, SOLUTION SUBCUTANEOUS at 20:58

## 2018-10-27 RX ADMIN — POTASSIUM CHLORIDE 1 MEQ: 750 TABLET, EXTENDED RELEASE ORAL at 20:58

## 2018-10-27 RX ADMIN — FUROSEMIDE 1 MG: 10 INJECTION, SOLUTION INTRAMUSCULAR; INTRAVENOUS at 06:34

## 2018-10-27 RX ADMIN — SODIUM CHLORIDE, PRESERVATIVE FREE 1 ML: 5 INJECTION INTRAVENOUS at 06:34

## 2018-10-27 RX ADMIN — CLOPIDOGREL BISULFATE 1 MG: 75 TABLET ORAL at 08:35

## 2018-10-27 RX ADMIN — INSULIN LISPRO 10 UNITS: 100 INJECTION, SOLUTION INTRAVENOUS; SUBCUTANEOUS at 12:12

## 2018-10-27 RX ADMIN — INSULIN LISPRO 2 UNITS: 100 INJECTION, SOLUTION INTRAVENOUS; SUBCUTANEOUS at 17:53

## 2018-10-27 RX ADMIN — POTASSIUM CHLORIDE 1 MEQ: 750 TABLET, EXTENDED RELEASE ORAL at 08:35

## 2018-10-27 RX ADMIN — MULTIPLE VITAMINS W/ MINERALS TAB 1 TAB: TAB at 08:36

## 2018-10-27 RX ADMIN — Medication 1 MG: at 08:36

## 2018-10-27 RX ADMIN — SODIUM CHLORIDE, PRESERVATIVE FREE 1 ML: 5 INJECTION INTRAVENOUS at 00:14

## 2018-10-28 LAB
ANION GAP: 7 MEQ/L (ref 8–16)
BLOOD UREA NITROGEN: 41 MG/DL (ref 7–18)
CALCIUM LEVEL: 8.6 MG/DL (ref 8.8–10.2)
CARBON DIOXIDE LEVEL: 32 MEQ/L (ref 21–32)
CHLORIDE LEVEL: 102 MEQ/L (ref 98–107)
CREATININE FOR GFR: 1.16 MG/DL (ref 0.55–1.3)
GFR SERPL CREATININE-BSD FRML MDRD: 47.6 ML/MIN/{1.73_M2} (ref 32–?)
GLUCOSE, FASTING: 150 MG/DL (ref 70–100)
HEMATOCRIT: 35.7 % (ref 36–47)
HEMOGLOBIN: 11.5 G/DL (ref 12–15.5)
MAGNESIUM LEVEL: 2.1 MG/DL (ref 1.8–2.4)
MEAN CORPUSCULAR HEMOGLOBIN: 30.3 PG (ref 27–33)
MEAN CORPUSCULAR HGB CONC: 32.2 G/DL (ref 32–36.5)
MEAN CORPUSCULAR VOLUME: 93.9 FL (ref 80–96)
NRBC BLD AUTO-RTO: 0 % (ref 0–0)
PLATELET COUNT, AUTOMATED: 222 10^3/UL (ref 150–450)
POTASSIUM SERUM: 3.4 MEQ/L (ref 3.5–5.1)
RED BLOOD COUNT: 3.8 10^6/UL (ref 4–5.4)
RED CELL DISTRIBUTION WIDTH: 14.6 % (ref 11.5–14.5)
SODIUM LEVEL: 141 MEQ/L (ref 136–145)
WHITE BLOOD COUNT: 9.5 10^3/UL (ref 4–10)

## 2018-10-28 RX ADMIN — ENOXAPARIN SODIUM 1 MG: 30 INJECTION SUBCUTANEOUS at 08:42

## 2018-10-28 RX ADMIN — METOPROLOL SUCCINATE 1 MG: 25 TABLET, EXTENDED RELEASE ORAL at 08:39

## 2018-10-28 RX ADMIN — MULTIPLE VITAMINS W/ MINERALS TAB 1 TAB: TAB at 08:39

## 2018-10-28 RX ADMIN — Medication 1 MG: at 08:41

## 2018-10-28 RX ADMIN — INSULIN LISPRO 2 UNITS: 100 INJECTION, SOLUTION INTRAVENOUS; SUBCUTANEOUS at 08:38

## 2018-10-28 RX ADMIN — GABAPENTIN 1 MG: 300 CAPSULE ORAL at 08:39

## 2018-10-28 RX ADMIN — CLOPIDOGREL BISULFATE 1 MG: 75 TABLET ORAL at 08:39

## 2018-10-28 RX ADMIN — SODIUM CHLORIDE, PRESERVATIVE FREE 1 ML: 5 INJECTION INTRAVENOUS at 05:34

## 2018-10-28 RX ADMIN — FUROSEMIDE 1 MG: 20 TABLET ORAL at 08:38

## 2018-10-28 RX ADMIN — POTASSIUM CHLORIDE 1 MEQ: 750 TABLET, EXTENDED RELEASE ORAL at 08:40

## 2020-05-25 ENCOUNTER — HOSPITAL ENCOUNTER (EMERGENCY)
Dept: HOSPITAL 53 - M ED | Age: 85
Discharge: HOME | End: 2020-05-25
Payer: COMMERCIAL

## 2020-05-25 VITALS — SYSTOLIC BLOOD PRESSURE: 189 MMHG | DIASTOLIC BLOOD PRESSURE: 98 MMHG

## 2020-05-25 VITALS — DIASTOLIC BLOOD PRESSURE: 88 MMHG | SYSTOLIC BLOOD PRESSURE: 155 MMHG

## 2020-05-25 DIAGNOSIS — Z95.0: ICD-10-CM

## 2020-05-25 DIAGNOSIS — I50.9: Primary | ICD-10-CM

## 2020-05-25 DIAGNOSIS — E11.9: ICD-10-CM

## 2020-05-25 DIAGNOSIS — I25.2: ICD-10-CM

## 2020-05-25 DIAGNOSIS — Z88.5: ICD-10-CM

## 2020-05-25 DIAGNOSIS — Z79.899: ICD-10-CM

## 2020-05-25 DIAGNOSIS — Z79.4: ICD-10-CM

## 2020-05-25 DIAGNOSIS — E03.9: ICD-10-CM

## 2020-05-25 DIAGNOSIS — I11.0: ICD-10-CM

## 2020-05-25 DIAGNOSIS — Z88.8: ICD-10-CM

## 2020-05-25 DIAGNOSIS — Z88.2: ICD-10-CM

## 2020-05-25 DIAGNOSIS — I25.10: ICD-10-CM

## 2020-05-25 DIAGNOSIS — Z79.02: ICD-10-CM

## 2020-05-25 DIAGNOSIS — E78.5: ICD-10-CM

## 2020-05-25 LAB
ALBUMIN SERPL BCG-MCNC: 3.5 GM/DL (ref 3.2–5.2)
ALT SERPL W P-5'-P-CCNC: 20 U/L (ref 12–78)
APPEARANCE UR: CLEAR
BACTERIA UR QL AUTO: NEGATIVE
BASE EXCESS BLDV CALC-SCNC: 1.6 MMOL/L (ref -2–2)
BASOPHILS # BLD AUTO: 0 10^3/UL (ref 0–0.2)
BASOPHILS NFR BLD AUTO: 0.3 % (ref 0–1)
BILIRUB CONJ SERPL-MCNC: 0.2 MG/DL (ref 0–0.2)
BILIRUB SERPL-MCNC: 0.7 MG/DL (ref 0.2–1)
BILIRUB UR QL STRIP.AUTO: NEGATIVE
CK MB CFR.DF SERPL CALC: 2.38
CK MB SERPL-MCNC: 2.4 NG/ML (ref ?–3.6)
CK SERPL-CCNC: 101 U/L (ref 26–192)
CO2 BLDV CALC-SCNC: 28.3 MEQ/L (ref 24–28)
EOSINOPHIL # BLD AUTO: 0.1 10^3/UL (ref 0–0.5)
EOSINOPHIL NFR BLD AUTO: 0.8 % (ref 0–3)
GLUCOSE UR QL STRIP.AUTO: (no result) MG/DL
HCO3 BLDV-SCNC: 26.9 MEQ/L (ref 23–27)
HCO3 STD BLDV-SCNC: 25.6 MEQ/L
HCT VFR BLD AUTO: 34.6 % (ref 36–47)
HGB BLD-MCNC: 11.3 G/DL (ref 12–15.5)
HGB UR QL STRIP.AUTO: NEGATIVE
KETONES UR QL STRIP.AUTO: NEGATIVE MG/DL
LEUKOCYTE ESTERASE UR QL STRIP.AUTO: NEGATIVE
LYMPHOCYTES # BLD AUTO: 1 10^3/UL (ref 1.5–5)
LYMPHOCYTES NFR BLD AUTO: 8 % (ref 24–44)
MCH RBC QN AUTO: 30 PG (ref 27–33)
MCHC RBC AUTO-ENTMCNC: 32.7 G/DL (ref 32–36.5)
MCV RBC AUTO: 91.8 FL (ref 80–96)
MONOCYTES # BLD AUTO: 0.3 10^3/UL (ref 0–0.8)
MONOCYTES NFR BLD AUTO: 2.6 % (ref 0–5)
MUCOUS THREADS URNS QL MICRO: (no result)
NEUTROPHILS # BLD AUTO: 10.7 10^3/UL (ref 1.5–8.5)
NEUTROPHILS NFR BLD AUTO: 87.7 % (ref 36–66)
NITRITE UR QL STRIP.AUTO: NEGATIVE
PCO2 BLDV: 45.1 MMHG (ref 38–50)
PH BLDV: 7.39 UNITS (ref 7.33–7.43)
PH UR STRIP.AUTO: 6 UNITS (ref 5–9)
PLATELET # BLD AUTO: 205 10^3/UL (ref 150–450)
PO2 BLDV: 50.1 MMHG (ref 30–50)
PROT SERPL-MCNC: 6.9 GM/DL (ref 6.4–8.2)
PROT UR QL STRIP.AUTO: NEGATIVE MG/DL
RBC # BLD AUTO: 3.77 10^6/UL (ref 4–5.4)
RBC # UR AUTO: 1 /HPF (ref 0–3)
SAO2 % BLDV: 82.1 % (ref 60–80)
SP GR UR STRIP.AUTO: 1.01 (ref 1–1.03)
SQUAMOUS #/AREA URNS AUTO: 0 /HPF (ref 0–6)
T4 SERPL-MCNC: 6.9 UG/DL (ref 4.5–12)
TROPONIN I SERPL-MCNC: 0.35 NG/ML (ref ?–0.1)
TSH SERPL DL<=0.005 MIU/L-ACNC: 1.7 UIU/ML (ref 0.36–3.74)
UROBILINOGEN UR QL STRIP.AUTO: 0.2 MG/DL (ref 0–2)
WBC # BLD AUTO: 12.2 10^3/UL (ref 4–10)
WBC #/AREA URNS AUTO: 4 /HPF (ref 0–3)

## 2020-05-25 PROCEDURE — 87077 CULTURE AEROBIC IDENTIFY: CPT

## 2020-05-25 PROCEDURE — 93005 ELECTROCARDIOGRAM TRACING: CPT

## 2020-05-25 PROCEDURE — 85025 COMPLETE CBC W/AUTO DIFF WBC: CPT

## 2020-05-25 PROCEDURE — 96374 THER/PROPH/DIAG INJ IV PUSH: CPT

## 2020-05-25 PROCEDURE — 81001 URINALYSIS AUTO W/SCOPE: CPT

## 2020-05-25 PROCEDURE — 80047 BASIC METABLC PNL IONIZED CA: CPT

## 2020-05-25 PROCEDURE — 83880 ASSAY OF NATRIURETIC PEPTIDE: CPT

## 2020-05-25 PROCEDURE — 84436 ASSAY OF TOTAL THYROXINE: CPT

## 2020-05-25 PROCEDURE — 83605 ASSAY OF LACTIC ACID: CPT

## 2020-05-25 PROCEDURE — 87040 BLOOD CULTURE FOR BACTERIA: CPT

## 2020-05-25 PROCEDURE — 82803 BLOOD GASES ANY COMBINATION: CPT

## 2020-05-25 PROCEDURE — 99285 EMERGENCY DEPT VISIT HI MDM: CPT

## 2020-05-25 PROCEDURE — 82550 ASSAY OF CK (CPK): CPT

## 2020-05-25 PROCEDURE — 71045 X-RAY EXAM CHEST 1 VIEW: CPT

## 2020-05-25 PROCEDURE — 82553 CREATINE MB FRACTION: CPT

## 2020-05-25 PROCEDURE — 80076 HEPATIC FUNCTION PANEL: CPT

## 2020-05-25 PROCEDURE — 84443 ASSAY THYROID STIM HORMONE: CPT

## 2020-05-25 PROCEDURE — 93041 RHYTHM ECG TRACING: CPT

## 2020-05-25 PROCEDURE — 84484 ASSAY OF TROPONIN QUANT: CPT

## 2020-05-25 NOTE — ECGEPIP
[facility] - ED

                                       

                                       Test Date:    2020

Pat Name:     ELMA POSADAS             Department:   

Patient ID:   [id number]                 Room:         -

Gender:       Female                   Technician:   

:          [phone]               Requested By: EARLENE COELHO

Order Number: [id number]     Reading MD:   Yesenia Gale

                                 Measurements

Intervals                              Axis          

Rate:         83                       P:            73

IL:           180                      QRS:          -87

QRSD:         209                      T:            76

QT:           475                                    

QTc:          561                                    

                           Interpretive Statements

ELECTRONIC VENTRICULAR PACEMAKER

ABNORMAL RHYTHM ECG

UNDERLYING SINUS RHYTHM

INCREASED RATE 3/6/17

Electronically Signed on 2020 7:36:40 EDT by Yesenia Gale

## 2020-05-26 NOTE — REP
PORTABLE CHEST X-RAY:  Single view.

 

HISTORY:  Dyspnea and cough.

 

Preliminary report is provided at the time of the exam by Dr. Gonzalez.

 

COMPARISON CHEST X-RAY:  October 26, 2018.

 

FINDINGS:  Monitoring electrodes overlie the chest.  A bipolar pacemaker is seen

in place.  Mitral annular calcification is observed.  Mild cardiomegaly is

observed.  Interstitial markings are diffusely prominent unchanged from the

October 26, 2018 study consistent with fibrosis.  There is no evidence of pleural

effusion or focal infiltrate.

 

IMPRESSION:

 

Chronic interstitial fibrosis pattern.  Pacemaker.  Otherwise no acute disease.

 

 

Electronically Signed by

Leobardo Evans MD 05/26/2020 11:01 A

## 2021-01-12 ENCOUNTER — HOSPITAL ENCOUNTER (OUTPATIENT)
Dept: HOSPITAL 53 - M LAB REF | Age: 86
End: 2021-01-12
Attending: INTERNAL MEDICINE
Payer: MEDICARE

## 2021-01-12 DIAGNOSIS — R41.81: Primary | ICD-10-CM

## 2021-01-12 LAB
FOLATE SERPL-MCNC: 5.5 NG/ML
VIT B12 SERPL-MCNC: 428 PG/ML

## 2021-06-28 ENCOUNTER — HOSPITAL ENCOUNTER (OUTPATIENT)
Dept: HOSPITAL 53 - M CLY | Age: 86
End: 2021-06-28
Attending: NURSE PRACTITIONER
Payer: MEDICARE

## 2021-06-28 DIAGNOSIS — M85.88: ICD-10-CM

## 2021-06-28 DIAGNOSIS — I51.7: Primary | ICD-10-CM

## 2021-06-28 DIAGNOSIS — J98.4: ICD-10-CM

## 2021-06-28 DIAGNOSIS — Z95.0: ICD-10-CM

## 2021-06-28 NOTE — REP
INDICATION:

F/U PNEUMONIA; LOWER LUNG OPACITIES FROM 5/29



COMPARISON:

05/25/2020



TECHNIQUE:

PA and lateral.



FINDINGS:

The mediastinum and cardiac silhouette are stable with cardiomegaly and dual pacemaker

again noted.  The lung fields demonstrate stable chronic interstitial changes without

acute consolidation, effusion, or pneumothorax.  Previously suggested left basilar

atelectasis resolved.  The skeletal structures are intact and demonstrate age-related

osteopenia and degenerative changes.



IMPRESSION:

No acute cardiopulmonary process.





<Electronically signed by Jose Carlos Gonzalez > 06/28/21 1120

## 2021-08-28 ENCOUNTER — HOSPITAL ENCOUNTER (INPATIENT)
Dept: HOSPITAL 53 - M ED | Age: 86
LOS: 1 days | Discharge: HOME HEALTH | DRG: 292 | End: 2021-08-29
Attending: INTERNAL MEDICINE | Admitting: INTERNAL MEDICINE
Payer: MEDICARE

## 2021-08-28 VITALS — DIASTOLIC BLOOD PRESSURE: 95 MMHG | SYSTOLIC BLOOD PRESSURE: 189 MMHG

## 2021-08-28 VITALS — WEIGHT: 199.52 LBS | BODY MASS INDEX: 34.06 KG/M2 | HEIGHT: 64 IN

## 2021-08-28 VITALS — SYSTOLIC BLOOD PRESSURE: 117 MMHG | DIASTOLIC BLOOD PRESSURE: 56 MMHG

## 2021-08-28 VITALS — DIASTOLIC BLOOD PRESSURE: 59 MMHG | SYSTOLIC BLOOD PRESSURE: 102 MMHG

## 2021-08-28 VITALS — DIASTOLIC BLOOD PRESSURE: 59 MMHG | SYSTOLIC BLOOD PRESSURE: 104 MMHG

## 2021-08-28 DIAGNOSIS — I45.2: ICD-10-CM

## 2021-08-28 DIAGNOSIS — E11.40: ICD-10-CM

## 2021-08-28 DIAGNOSIS — R27.0: ICD-10-CM

## 2021-08-28 DIAGNOSIS — Z66: ICD-10-CM

## 2021-08-28 DIAGNOSIS — I44.2: ICD-10-CM

## 2021-08-28 DIAGNOSIS — Z85.3: ICD-10-CM

## 2021-08-28 DIAGNOSIS — Z95.0: ICD-10-CM

## 2021-08-28 DIAGNOSIS — I50.33: ICD-10-CM

## 2021-08-28 DIAGNOSIS — Z79.02: ICD-10-CM

## 2021-08-28 DIAGNOSIS — Z98.49: ICD-10-CM

## 2021-08-28 DIAGNOSIS — Z79.899: ICD-10-CM

## 2021-08-28 DIAGNOSIS — I16.0: ICD-10-CM

## 2021-08-28 DIAGNOSIS — I11.0: Primary | ICD-10-CM

## 2021-08-28 DIAGNOSIS — Z79.4: ICD-10-CM

## 2021-08-28 DIAGNOSIS — Z88.5: ICD-10-CM

## 2021-08-28 DIAGNOSIS — I08.0: ICD-10-CM

## 2021-08-28 DIAGNOSIS — Z88.8: ICD-10-CM

## 2021-08-28 DIAGNOSIS — E03.9: ICD-10-CM

## 2021-08-28 DIAGNOSIS — Z88.2: ICD-10-CM

## 2021-08-28 DIAGNOSIS — Z86.73: ICD-10-CM

## 2021-08-28 LAB
ALBUMIN SERPL BCG-MCNC: 3.3 GM/DL (ref 3.2–5.2)
ALT SERPL W P-5'-P-CCNC: 28 U/L (ref 12–78)
BASOPHILS # BLD AUTO: 0 10^3/UL (ref 0–0.2)
BASOPHILS NFR BLD AUTO: 0.4 % (ref 0–1)
BILIRUB CONJ SERPL-MCNC: 0.2 MG/DL (ref 0–0.2)
BILIRUB SERPL-MCNC: 0.6 MG/DL (ref 0.2–1)
BUN SERPL-MCNC: 20 MG/DL (ref 7–18)
CALCIUM SERPL-MCNC: 8.5 MG/DL (ref 8.8–10.2)
CHLORIDE SERPL-SCNC: 108 MEQ/L (ref 98–107)
CK MB CFR.DF SERPL CALC: 2.87
CK MB CFR.DF SERPL CALC: 3.23
CK MB SERPL-MCNC: 2.7 NG/ML (ref ?–3.6)
CK MB SERPL-MCNC: 3.2 NG/ML (ref ?–3.6)
CK SERPL-CCNC: 94 U/L (ref 26–192)
CK SERPL-CCNC: 99 U/L (ref 26–192)
CO2 SERPL-SCNC: 28 MEQ/L (ref 21–32)
CREAT SERPL-MCNC: 1.26 MG/DL (ref 0.55–1.3)
EOSINOPHIL # BLD AUTO: 0.1 10^3/UL (ref 0–0.5)
EOSINOPHIL NFR BLD AUTO: 0.8 % (ref 0–3)
GFR SERPL CREATININE-BSD FRML MDRD: 43 ML/MIN/{1.73_M2} (ref 32–?)
GLUCOSE SERPL-MCNC: 276 MG/DL (ref 70–100)
HCT VFR BLD AUTO: 37.1 % (ref 36–47)
HGB BLD-MCNC: 11.9 G/DL (ref 12–15.5)
LYMPHOCYTES # BLD AUTO: 0.8 10^3/UL (ref 1.5–5)
LYMPHOCYTES NFR BLD AUTO: 6.9 % (ref 24–44)
MAGNESIUM SERPL-MCNC: 1.9 MG/DL (ref 1.8–2.4)
MCH RBC QN AUTO: 30 PG (ref 27–33)
MCHC RBC AUTO-ENTMCNC: 32.1 G/DL (ref 32–36.5)
MCV RBC AUTO: 93.5 FL (ref 80–96)
MONOCYTES # BLD AUTO: 0.3 10^3/UL (ref 0–0.8)
MONOCYTES NFR BLD AUTO: 2.5 % (ref 2–8)
NEUTROPHILS # BLD AUTO: 10.1 10^3/UL (ref 1.5–8.5)
NEUTROPHILS NFR BLD AUTO: 89 % (ref 36–66)
NT-PRO BNP: 2342 PG/ML (ref ?–450)
PLATELET # BLD AUTO: 200 10^3/UL (ref 150–450)
POTASSIUM SERPL-SCNC: 4.3 MEQ/L (ref 3.5–5.1)
PROT SERPL-MCNC: 6.7 GM/DL (ref 6.4–8.2)
RBC # BLD AUTO: 3.97 10^6/UL (ref 4–5.4)
RSV RNA NPH QL NAA+PROBE: NEGATIVE
SODIUM SERPL-SCNC: 141 MEQ/L (ref 136–145)
TROPONIN I SERPL-MCNC: 0.28 NG/ML (ref ?–0.1)
TROPONIN I SERPL-MCNC: 0.3 NG/ML (ref ?–0.1)
TSH SERPL DL<=0.005 MIU/L-ACNC: 2.81 UIU/ML (ref 0.36–3.74)
WBC # BLD AUTO: 11.4 10^3/UL (ref 4–10)

## 2021-08-28 RX ADMIN — INSULIN LISPRO SCH UNITS: 100 INJECTION, SOLUTION INTRAVENOUS; SUBCUTANEOUS at 13:07

## 2021-08-28 RX ADMIN — INSULIN LISPRO SCH UNITS: 100 INJECTION, SOLUTION INTRAVENOUS; SUBCUTANEOUS at 17:56

## 2021-08-28 RX ADMIN — SODIUM CHLORIDE, PRESERVATIVE FREE SCH ML: 5 INJECTION INTRAVENOUS at 13:39

## 2021-08-28 RX ADMIN — POTASSIUM CHLORIDE SCH MEQ: 750 TABLET, EXTENDED RELEASE ORAL at 13:08

## 2021-08-28 RX ADMIN — HYDRALAZINE HYDROCHLORIDE SCH MG: 25 TABLET, FILM COATED ORAL at 17:29

## 2021-08-28 RX ADMIN — INSULIN DETEMIR SCH UNITS: 100 INJECTION, SOLUTION SUBCUTANEOUS at 20:17

## 2021-08-28 RX ADMIN — SODIUM CHLORIDE, PRESERVATIVE FREE SCH ML: 5 INJECTION INTRAVENOUS at 20:18

## 2021-08-28 RX ADMIN — ENOXAPARIN SODIUM SCH MG: 40 INJECTION SUBCUTANEOUS at 13:06

## 2021-08-28 RX ADMIN — HYDRALAZINE HYDROCHLORIDE SCH MG: 25 TABLET, FILM COATED ORAL at 23:17

## 2021-08-28 RX ADMIN — INSULIN DETEMIR SCH UNITS: 100 INJECTION, SOLUTION SUBCUTANEOUS at 13:07

## 2021-08-28 RX ADMIN — METOPROLOL SUCCINATE SCH MG: 25 TABLET, EXTENDED RELEASE ORAL at 13:08

## 2021-08-28 RX ADMIN — CLOPIDOGREL BISULFATE SCH MG: 75 TABLET ORAL at 13:08

## 2021-08-28 RX ADMIN — HYDRALAZINE HYDROCHLORIDE SCH MG: 25 TABLET, FILM COATED ORAL at 13:09

## 2021-08-28 NOTE — HPEPDOC
General


Date of Admission


21


Date of Service:  Aug 28, 2021


Chief Complaint


The patient is a 86-year-old female admitted with a reason for visit of SOB.


Source:  Patient, RN/MD





History of Present Illness


86-year-old female with history of diastolic congestive heart failure, 

hypertension with hypertensive heart disease, diabetes, degenerative aortic and 

mitral valvular disease, hypothyroid, pacemaker due to AV block presented to the

emergency room on 2021 early morning with sudden onset shortness of breath 

from the evening of 2021.  Patient reported that all day yesterday she was 

fine and then after dinner she started feeling a little short of breath which 

got worse.  She could not lay down.  Could not catch her breath and felt she was

wheezing.  At about 2 AM woke up his her son as she was getting distressed and 

EMS was called and she came to the emergency room.  She reported she may have 

been wheezing at that time.  Denied any cough or phlegm denied any leg swelling.

 She reports she has been taking all her medications.  On presentation to the ED

she was found to be hypertensive with a blood pressure of 190/90.  Chest x-ray 

showed possible interstitial edema.  She then had a CT angio of the chest done 

which was negative for pulmonary embolism did show bilateral basal atelectasis 

and also interstitial edema. She was admitted for hypertensive urgency and 

diastolic CHF exacerbation





Home Medications


Scheduled


Atorvastatin Calcium (Atorvastatin Calcium) 40 Mg Tab, 40 MG PO QPM, (Reported)


   1700 


Cholecalciferol (Vitamin D3) (Vitamin D3) 1,000 Unit Tablet, 2,000 UNITS PO 

DAILY, (Reported)


Clopidogrel Bisulfate (Clopidogrel) 75 Mg Tab, 75 MG PO DAILY, (Reported)


Docusate Sodium (Dok) 100 Mg Tab, 100 MG PO DAILY, (Reported)


Furosemide (Furosemide) 40 Mg Tablet, 40 MG PO DAILY


Insulin Glargine,Hum.rec.anlog (Lantus Solostar) 100 Unit/Ml Inj, 28 UNIT SC 

QPM, (Reported)


Metoprolol Succinate (Metoprolol Succinate) 25 Mg Tab.er.24h, 25 MG PO DAILY, 

(Reported)


Multivitamins (Thera M Plus Tablet) 1 Tab Tab, 1 TAB PO DAILY, (Reported)


Potassium Chloride (Potassium Chloride) 20 Meq Tab.er.prt, 20 MEQ PO DAILY, 

(Reported)


Sertraline Hcl (Zoloft) 50 Mg Tab, 50 MG PO QHS, (Reported)





Allergies


Coded Allergies:  


     Sulfa (Sulfonamide Antibiotics) (Verified  Allergy, Intermediate, RASH 

HIVES, 20)


     codeine (Verified  Allergy, Intermediate, RASH HIVES, 20)


     tamoxifen (Verified  Adverse Reaction, Severe, SEVERE NAUSEA, 20)





Past Medical History


Medical History


Third-degree heart block, right bundle branch block, left anterior fascicular 

block s/p Pacemaker 


Diastolic CHF


Diabetes.


Hypertension with hypertensive heart disease


Hyperlipidemia.


Hypothyroidism.


Anxiety disorder.


MI (NSTEMI) in 2018


Obesity


Degenerative aortic valve disease: aortic stenosis and aortic regurgitation 


Degenerative mitral valvular disease : Mild mitral stenosis and mild mitral 

regurgitation 


History of breast cancer normal coronary arteries as by cardiac catheterization 


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 


Rheumatic fever at age 9, 


Neuropathy, 


Sensory ataxia imbalance,


Surgical History


Pacemaker placed 


Knee surgery, bilateral.


Cataract surgery.


Partial breast mastectomy for ductal cell carcinoma


Cardiac cath in : Normal coronaries and normal EF.





Family History


Father had coronary artery disease (CAD) and  at age 50.


Mother had hypercholesterolemia, systemic hypertension, and diabetes.  


One of her son's has coronary artery disease with heart attack at age 46 and had


hypercholesterolemia and systemic hypertension.





Social History


* Smoker:  Denies


Alcohol:  Denies


Drugs:  denies





A-FIB/CHADSVASC


A-FIB History


Current/History of A-Fib/PAF?:  No





Review of Systems


Constitutional:  Denies: Chills, Fever, Night Sweats


Eyes:  Denies: Pain, Vision change


ENT:  Denies: Head Aches, Ear Pain, Dysphagia


Skin:  Denies: Rash, Lesions, Breakdown


Pulmonary:  Reports: Dyspnea


Cardiovascular:  Reports: Orthopnea; 


   Denies: Chest Pain, Palpitations, Paroxysmal Noc. Dyspnea, Lt Headedness


Gastrointestinal:  Denies: Nausea, Vomiting, Abdominal Pain, Diarrhea


Genitourinary:  Denies: Dysuria, Frequency


Hematologic:  Denies: Bruising, Bleeding Excessively


Musculoskeletal:  Reports: Back Pain, Joint Pain; 


   Denies: Neck Pain, Muscle Pain, Spasms


Neurological:  Denies: Weakness, Numbness, Change in speech, Confusion





Physical Examination


General Exam:  Positive: Alert, Cooperative, No Acute Distress


Eye Exam:  Positive: PERRLA, Conjunctiva & lids normal, EOMI; 


   Negative: Sclera icteric


ENT Exam:  Positive: Atraumatic, Mucous membr. moist/pink, Pharynx Normal


Neck Exam:  Positive: Supple, JVD; 


   Negative: thyromegaly


Chest Exam:  Positive: Normal air movement, Diminished (Bilateral diffuse basal 

crackles); 


   Negative: Rales, Rhonchi, Wheezing


Heart Exam:  Positive: Rate Normal, Regular Rhythm, Normal S1, Normal S2, 

Murmurs (Systolic murmurs); 


   Negative: Rubs


Telemetry:  Positive: Other Telemetry: (Paced rhythm)


Abdomen Exam:  Positive: Normal bowel sounds, Soft; 


   Negative: Tenderness


Extremity Exam:  Negative: Clubbing, Cyanosis, Edema


Neuro Exam:  Positive: Normal Speech, Strength at 5/5 X4 ext, Normal Tone


Psych Exam:  Positive: Memory Intact, Oriented x 3





Vital Signs





Vital Signs








  Date Time  Temp Pulse Resp B/P (MAP) Pulse Ox O2 Delivery O2 Flow Rate FiO2


 


21 07:50  82 22 190/110 (136) 97 Nasal Cannula 2.0 


 


21 07:10 96.9       











Laboratory Data


Labs 24H


Laboratory Tests 2


21 03:19: 


Immature Granulocyte % (Auto) 0.4, Neutrophils (%) (Auto) 89.0H, Lymphocytes (%)

(Auto) 6.9L, Monocytes (%) (Auto) 2.5, Eosinophils (%) (Auto) 0.8, Basophils (%)

(Auto) 0.4, Neutrophils # (Auto) 10.1H, Lymphocytes # (Auto) 0.8L, Monocytes # 

(Auto) 0.3, Eosinophils # (Auto) 0.1, Basophils # (Auto) 0.0, Nucleated Red 

Blood Cells % (auto) 0.0, Anion Gap 5L, Glomerular Filtration Rate 43.0, Calcium

Level 8.5L, Total Bilirubin 0.6, Direct Bilirubin 0.2, Aspartate Amino Transf 

(AST/SGOT) 16, Alanine Aminotransferase (ALT/SGPT) 28, Alkaline Phosphatase 

151H, Total Creatine Kinase 94, Creatine Kinase MB 2.7, Creatine Kinase MB 

Relative Index 2.87, Troponin I 0.28H, NT-Pro-B-Type Natriuretic Peptide 2342H, 

Total Protein 6.7, Albumin 3.3, Albumin/Globulin Ratio 1.0L, Thyroid Stimulating

Hormone (TSH) 2.810


21 06:00: 


Total Creatine Kinase 99, Creatine Kinase MB 3.2, Creatine Kinase MB Relative 

Index 3.23, Troponin I 0.30H


CBC/BMP


Laboratory Tests


21 03:19











 Assessment/Plan


86-year-old female with history of diastolic congestive heart failure, 

hypertension with hypertensive heart disease, diabetes, degenerative aortic and 

mitral valvular disease, hypothyroid, pacemaker due to AV block presented to the

emergency room on 2021 early morning with sudden onset shortness of breath 

from the evening of 2021.  Patient reported that all day yesterday she was 

fine and then after dinner she started feeling a little short of breath which 

got worse then about 2 AM woke up his her son and as she could not catch her 

breath and was getting distressed and EMS was called and she came to the 

emergency room.  She reported she may have been wheezing at that time.  Denied 

any cough or phlegm denied any leg swelling.  She reports she has been taking 

all her medications.  On presentation to the ED she was found to be hypertensive

with a blood pressure of 190/90.  Chest x-ray showed possible interstitial 

edema.  She then had a CT angio of the chest done which was negative for 

pulmonary embolism did show bilateral basal atelectasis and also interstitial 

edema.  She was admitted for hypertensive urgency and diastolic CHF exacerbation





Hypertensive urgency with flash pulmonary edema  vs Diastolic CHF exacerbation


Unsure if she went into an hypertensive urgency and developed acute heart 

failure or she developed acute heart failure which led to hypertensive urgency


Will get a renal Doppler flow to evaluate for renal artery stenosis


We will continue with IV Lasix


Monitor intake and output, 1.8 L fluid restriction





Aortic and mitral valvular heart disease


She does have calcific aortic valve and mild aortic stenosis as well as aortic 

regurgitation she also does have calcified mitral annular ring as well as mitral

regurgitation


This also may have caused CHF exacerbation





Diabetes


Continue Levemir and lispro as per sliding scale


Fingerstick before meals and at bedtime





Hypertension


Continue with metoprolol and Lasix


Patient has received a Nitropaste in the ED. we will keep it on for now.


We will get a renal Doppler flow


We will start on amlodipine and hydralazine with hold parameters





Hypothyroid


Not on any medication





Pacemaker in place due to AV block


All paced rhythm at present





Plan / VTE


VTE Prophylaxis Ordered?:  Yes











Denisse Clarke MD                   Aug 28, 2021 09:44

## 2021-08-28 NOTE — ECGEPIP
[facility] - ED

                                       

                                       Test Date:    2021

Pat Name:     ELMA POSADAS             Department:   

Patient ID:   [id number]                 Room:         -

Gender:       Female                   Technician:   MARYANNE

:          1935               Requested By: MILES TRENT

Order Number: [id number]     Reading MD:   Quintin James

                                 Measurements

Intervals                              Axis          

Rate:         71                       P:            73

NC:           156                      QRS:          -85

QRSD:         200                      T:            82

QT:           488                                    

QTc:          530                                    

                           Interpretive Statements

Atrial-sensed ventricular-paced rhythm

Similar to tracing done 20

Electronically Signed on 2021 20:17:45 EDT by Quintin James

## 2021-08-28 NOTE — REPVR
PROCEDURE INFORMATION: 

Exam: XR Chest 

Exam date and time: 8/28/2021 3:20 AM 

Age: 86 years old 

Clinical indication: Other: Dyspnea/cough 



TECHNIQUE: 

Imaging protocol: XR of the chest. 

Views: 1 view. 



COMPARISON: 

CR CHEST 2 VIEW 6/28/2021 11:08 AM 



FINDINGS: 

Tubes, catheters and devices: There is a left sided pacemaker. 



Lungs: There is increased prominence of the interstitium in both lung bases, 

which represents a change from the prior exam. The central pulmonary vessels do 

not appear significantly enlarged. 

Pleural spaces: No pleural effusions or pneumothorax identified. 

Heart/Mediastinum: Pronounced mitral annulus calcifications are again noted. 

The heart is borderline in size, unchanged. 

Bones/joints: Degenerative endplate changes are seen at multiple levels in the 

visualized spine. 



IMPRESSION: 

Increased prominence of the interstitium in the lung bases, without enlargement 

of the central pulmonary vessels. This may represent interstitial infiltrates 

from an infectious/inflammatory process or from mild interstitial pulmonary 

edema. 



Electronically signed by: Kelli Manuel On 08/28/2021  04:21:38 AM

## 2021-08-28 NOTE — REPVR
PROCEDURE INFORMATION: 

Exam: CTA Chest With Contrast 

Exam date and time: 8/28/2021 7:13 AM 

Age: 86 years old 

Clinical indication: Shortness of breath and wheezing; Additional info: SOB 



TECHNIQUE: 

Imaging protocol: Computed tomographic angiography of the chest with contrast. 

3D rendering (Not supervised by radiologist): MIP and/or 3D reconstructed 

images were created by the technologist. 

Radiation optimization: All CT scans at this facility use at least one of these 

dose optimization techniques: automated exposure control; mA and/or kV 

adjustment per patient size (includes targeted exams where dose is matched to 

clinical indication); or iterative reconstruction. 

Contrast material: ISOVUE 370; Contrast volume: 75 ml; Contrast route: 

INTRAVENOUS (IV);  



COMPARISON: 

CR PORTABLE CHEST X-RAY 8/28/2021 3:09 AM 



FINDINGS: 

Limitations: There is motion artifact. 

Tubes, catheters and devices: There is a left sided pacemaker. 



Pulmonary arteries: The pulmonary arteries are not enlarged. No filling defects 

are seen to indicate an acute pulmonary embolism. Assessment of the smaller 

branches is limited in some locations due to motion artifact. 

Aorta: The ascending aorta is borderline in size at 3.8 cm in diameter.The 

contrast bolus was not timed for optimal assessment of the thoracic aorta, but 

there is no sign of thoracic aortic dissection.The aorta demonstrates moderate 

atherosclerotic calcification. 



Lungs: Motion artifact limits the assessment of the fine detail of the lungs. 

There is small bibasilar atelectasis. There is mild geographic ground-glass 

attenuation of the lungs. 

Pleural spaces: There are small, bilateral pleural effusions. 

Heart: The heart is enlarged. Pronounced mitral annulus calcifications are 

noted. Aortic valve calcifications are present. There is moderate 

atherosclerotic calcification of the coronary arteries. 

Lymph nodes: There are multiple mildly enlarged, nonspecific mediastinal nodes. 

Some of the mediastinal lymph nodes are partially calcified. 



Bones/joints: Large flowing endplate spurs are seen at multiple levels in the 

visualized spine, consistent with benign diffuse idiopathic skeletal 

hyperostosis (DISH). 

Soft tissues: Unremarkable. 



IMPRESSION: 

1. No evidence of acute pulmonary embolism. Assessment of some of the smaller 

branches is limited due to motion artifact, especially in the lung bases. 

2. Mild, geographic ground-glass of attenuation of the lungs and small pleural 

effusions, which are nonspecific findings but may be due to mild interstitial 

pulmonary edema. Assessment of the fine detail of the lungs is limited. 

3. Small amount of bilateral lung base atelectasis. 



Electronically signed by: Kelli Manuel On 08/28/2021  07:38:12 AM

## 2021-08-29 VITALS — DIASTOLIC BLOOD PRESSURE: 58 MMHG | SYSTOLIC BLOOD PRESSURE: 101 MMHG

## 2021-08-29 VITALS — DIASTOLIC BLOOD PRESSURE: 63 MMHG | SYSTOLIC BLOOD PRESSURE: 138 MMHG

## 2021-08-29 VITALS — SYSTOLIC BLOOD PRESSURE: 138 MMHG | DIASTOLIC BLOOD PRESSURE: 63 MMHG

## 2021-08-29 VITALS — DIASTOLIC BLOOD PRESSURE: 67 MMHG | SYSTOLIC BLOOD PRESSURE: 133 MMHG

## 2021-08-29 LAB
BASOPHILS # BLD AUTO: 0.1 10^3/UL (ref 0–0.2)
BASOPHILS NFR BLD AUTO: 0.9 % (ref 0–1)
BUN SERPL-MCNC: 25 MG/DL (ref 7–18)
CALCIUM SERPL-MCNC: 8.8 MG/DL (ref 8.8–10.2)
CHLORIDE SERPL-SCNC: 106 MEQ/L (ref 98–107)
CO2 SERPL-SCNC: 31 MEQ/L (ref 21–32)
CREAT SERPL-MCNC: 1.51 MG/DL (ref 0.55–1.3)
EOSINOPHIL # BLD AUTO: 0.2 10^3/UL (ref 0–0.5)
EOSINOPHIL NFR BLD AUTO: 3 % (ref 0–3)
GFR SERPL CREATININE-BSD FRML MDRD: 34.9 ML/MIN/{1.73_M2} (ref 32–?)
GLUCOSE SERPL-MCNC: 69 MG/DL (ref 70–100)
HCT VFR BLD AUTO: 36.9 % (ref 36–47)
HGB BLD-MCNC: 11.8 G/DL (ref 12–15.5)
LYMPHOCYTES # BLD AUTO: 1.2 10^3/UL (ref 1.5–5)
LYMPHOCYTES NFR BLD AUTO: 21 % (ref 24–44)
MAGNESIUM SERPL-MCNC: 1.9 MG/DL (ref 1.8–2.4)
MCH RBC QN AUTO: 29.4 PG (ref 27–33)
MCHC RBC AUTO-ENTMCNC: 32 G/DL (ref 32–36.5)
MCV RBC AUTO: 92 FL (ref 80–96)
MONOCYTES # BLD AUTO: 0.3 10^3/UL (ref 0–0.8)
MONOCYTES NFR BLD AUTO: 5.8 % (ref 2–8)
NEUTROPHILS # BLD AUTO: 3.9 10^3/UL (ref 1.5–8.5)
NEUTROPHILS NFR BLD AUTO: 69.1 % (ref 36–66)
PLATELET # BLD AUTO: 190 10^3/UL (ref 150–450)
POTASSIUM SERPL-SCNC: 3.5 MEQ/L (ref 3.5–5.1)
RBC # BLD AUTO: 4.01 10^6/UL (ref 4–5.4)
SODIUM SERPL-SCNC: 142 MEQ/L (ref 136–145)
WBC # BLD AUTO: 5.7 10^3/UL (ref 4–10)

## 2021-08-29 RX ADMIN — INSULIN LISPRO SCH UNITS: 100 INJECTION, SOLUTION INTRAVENOUS; SUBCUTANEOUS at 07:30

## 2021-08-29 RX ADMIN — METOPROLOL SUCCINATE SCH MG: 25 TABLET, EXTENDED RELEASE ORAL at 09:30

## 2021-08-29 RX ADMIN — SODIUM CHLORIDE, PRESERVATIVE FREE SCH ML: 5 INJECTION INTRAVENOUS at 06:35

## 2021-08-29 RX ADMIN — ENOXAPARIN SODIUM SCH MG: 40 INJECTION SUBCUTANEOUS at 09:30

## 2021-08-29 RX ADMIN — POTASSIUM CHLORIDE SCH MEQ: 750 TABLET, EXTENDED RELEASE ORAL at 09:29

## 2021-08-29 RX ADMIN — CLOPIDOGREL BISULFATE SCH MG: 75 TABLET ORAL at 09:30

## 2021-08-29 RX ADMIN — HYDRALAZINE HYDROCHLORIDE SCH MG: 25 TABLET, FILM COATED ORAL at 06:00

## 2021-08-29 NOTE — REP
INDICATION:

right ovarian/ adenexal complex mass.



COMPARISON:

None



TECHNIQUE:

Axial contrast-enhanced images from the lung bases to the pubic symphysis using 100 cc

Isovue 370 intravenous contrast material.  .



This CT examination was performed using the following dose reduction techniques:

Automated exposure control, adjustment of mA and/or kv according to the patient's

size, and the use of iterative reconstruction technique.



FINDINGS:

Liver, spleen, pancreas, gallbladder, and bilateral adrenal glands are normal.

Kidneys demonstrate symmetric age-related cortical thinning without hydronephrosis or

perinephric stranding.



The enteric system including stomach, small, and large bowel appears normal.  No

evidence for obstruction or acute inflammatory process.  Normal terminal ileum and

appendix are identified in the right lower quadrant.



Pelvis demonstrates normal bladder and age-appropriate uterus.  Left ovary includes

2.6 cm cyst which appears relatively simple by CT evaluation.  Right ovary is

dominated by a suspected proteinaceous cyst measuring 7.5 cm in length and 5 cm

diameter.  No specific pelvic fluid or obvious adenopathy appreciated.



No ascites.  No free air.  No intraperitoneal or retroperitoneal adenopathy.

Atherosclerotic changes to the aorta and vasculature noted.  Musculoskeletal

structures demonstrate age-related osteopenia and degenerative changes without acute

osseous abnormality.



IMPRESSION:

1.  7.5 x 5 cm presumed proteinaceous cyst dominates the right ovary.  No associated

acute stranding, fluid or adenopathy noted.  2.6 cm simple left ovarian cyst also

identified.

2.  No further acute abdominopelvic pathology appreciated.





<Electronically signed by Jose Carlos Gonzalez > 08/29/21 2199

## 2021-08-29 NOTE — REP
INDICATION:

? FIORELLA



COMPARISON:

None



TECHNIQUE:

Real time gray scale ultrasound examination using curved array transducer followed by

color Doppler evaluation of the renal vasculature.



FINDINGS:

Bilateral kidneys are normal in reniform shape.  Right kidney measures 9.6 x 4.2 x 4.0

cm.  Left kidney measures 9.4 x 4.3 x 4.9 cm.  No hydronephrosis, nephrolithiasis or

cystic mass lesion identified.  Bladder is unremarkable.



Incidental complex right adnexal mass and complex appearance to the right ovary with

septated area incompletely evaluated.





Color Doppler evaluation.



Peak aortic velocity: 55.5 centimeters/second



RIGHT KIDNEY

Renal arterial velocity: 118.4 centimeters/second

Renal-aortic ratio: 2.1

Intrarenal resistive indices: 0.81

Intrarenal acceleration times: 0.04-0.07

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



LEFT KIDNEY

Renal arterial velocity: 77.8 centimeters/second

Renal-aortic ratio: 1.4

Intrarenal resistive indices: 0.83-0.88

Intrarenal acceleration times: 0.02-0.06





IMPRESSION:

1. Kidneys appear essentially age-appropriate and without hydronephrosis.

2. Doppler interegation without definite sonographic evidence for renal arterial

stenosis.

3. Complex right ovary and adnexal mass are suspected and incompletely evaluated.

Consider pelvic ultrasound and or contrast-enhanced CT of the abdomen and pelvis for

further investigation.





<Electronically signed by Jose Carlos Gonzalez > 08/29/21 0916

## 2022-11-08 ENCOUNTER — HOSPITAL ENCOUNTER (INPATIENT)
Dept: HOSPITAL 53 - M ED | Age: 87
LOS: 10 days | Discharge: HOME HEALTH | DRG: 193 | End: 2022-11-18
Attending: INTERNAL MEDICINE | Admitting: FAMILY MEDICINE
Payer: MEDICARE

## 2022-11-08 VITALS — BODY MASS INDEX: 29.75 KG/M2 | WEIGHT: 178.57 LBS | HEIGHT: 65 IN

## 2022-11-08 DIAGNOSIS — Z98.49: ICD-10-CM

## 2022-11-08 DIAGNOSIS — Z88.2: ICD-10-CM

## 2022-11-08 DIAGNOSIS — J96.01: ICD-10-CM

## 2022-11-08 DIAGNOSIS — Z66: ICD-10-CM

## 2022-11-08 DIAGNOSIS — E11.40: ICD-10-CM

## 2022-11-08 DIAGNOSIS — I44.2: ICD-10-CM

## 2022-11-08 DIAGNOSIS — I13.0: ICD-10-CM

## 2022-11-08 DIAGNOSIS — I50.33: ICD-10-CM

## 2022-11-08 DIAGNOSIS — I25.10: ICD-10-CM

## 2022-11-08 DIAGNOSIS — J18.9: Primary | ICD-10-CM

## 2022-11-08 DIAGNOSIS — Z85.3: ICD-10-CM

## 2022-11-08 DIAGNOSIS — Z95.0: ICD-10-CM

## 2022-11-08 DIAGNOSIS — E66.9: ICD-10-CM

## 2022-11-08 DIAGNOSIS — Z79.02: ICD-10-CM

## 2022-11-08 DIAGNOSIS — N17.9: ICD-10-CM

## 2022-11-08 DIAGNOSIS — Z79.4: ICD-10-CM

## 2022-11-08 DIAGNOSIS — F32.A: ICD-10-CM

## 2022-11-08 DIAGNOSIS — R91.8: ICD-10-CM

## 2022-11-08 DIAGNOSIS — R29.6: ICD-10-CM

## 2022-11-08 DIAGNOSIS — E03.9: ICD-10-CM

## 2022-11-08 DIAGNOSIS — F41.9: ICD-10-CM

## 2022-11-08 DIAGNOSIS — Z88.5: ICD-10-CM

## 2022-11-08 DIAGNOSIS — I35.2: ICD-10-CM

## 2022-11-08 DIAGNOSIS — I25.2: ICD-10-CM

## 2022-11-08 DIAGNOSIS — G93.41: ICD-10-CM

## 2022-11-08 DIAGNOSIS — E87.6: ICD-10-CM

## 2022-11-08 DIAGNOSIS — J84.10: ICD-10-CM

## 2022-11-08 DIAGNOSIS — Z88.8: ICD-10-CM

## 2022-11-08 DIAGNOSIS — Z86.73: ICD-10-CM

## 2022-11-08 DIAGNOSIS — I24.8: ICD-10-CM

## 2022-11-08 DIAGNOSIS — D64.9: ICD-10-CM

## 2022-11-08 DIAGNOSIS — N18.30: ICD-10-CM

## 2022-11-08 DIAGNOSIS — E78.5: ICD-10-CM

## 2022-11-08 DIAGNOSIS — E11.22: ICD-10-CM

## 2022-11-08 DIAGNOSIS — R27.0: ICD-10-CM

## 2022-11-08 DIAGNOSIS — Z79.899: ICD-10-CM

## 2022-11-08 LAB
ALBUMIN SERPL BCG-MCNC: 3.6 GM/DL (ref 3.2–5.2)
ALT SERPL W P-5'-P-CCNC: 30 U/L (ref 12–78)
BASOPHILS # BLD AUTO: 0 10^3/UL (ref 0–0.2)
BASOPHILS NFR BLD AUTO: 0.3 % (ref 0–1)
BILIRUB CONJ SERPL-MCNC: 0.3 MG/DL (ref 0–0.2)
BILIRUB SERPL-MCNC: 0.9 MG/DL (ref 0.2–1)
BUN SERPL-MCNC: 23 MG/DL (ref 7–18)
CALCIUM SERPL-MCNC: 9.3 MG/DL (ref 8.8–10.2)
CHLORIDE SERPL-SCNC: 102 MEQ/L (ref 98–107)
CK MB CFR.DF SERPL CALC: 0.6
CK MB CFR.DF SERPL CALC: 0.71
CK MB SERPL-MCNC: 1.4 NG/ML (ref ?–3.6)
CK MB SERPL-MCNC: 1.6 NG/ML (ref ?–3.6)
CK SERPL-CCNC: 226 U/L (ref 26–192)
CK SERPL-CCNC: 234 U/L (ref 26–192)
CO2 SERPL-SCNC: 26 MEQ/L (ref 21–32)
CREAT SERPL-MCNC: 1.73 MG/DL (ref 0.55–1.3)
CREAT UR-MCNC: 110 MG/DL
EOSINOPHIL # BLD AUTO: 0 10^3/UL (ref 0–0.5)
EOSINOPHIL NFR BLD AUTO: 0.2 % (ref 0–3)
GFR SERPL CREATININE-BSD FRML MDRD: 29.7 ML/MIN/{1.73_M2} (ref 32–?)
GLUCOSE SERPL-MCNC: 273 MG/DL (ref 70–100)
HCT VFR BLD AUTO: 45.4 % (ref 36–47)
HGB BLD-MCNC: 14.4 G/DL (ref 12–15.5)
LIPASE SERPL-CCNC: 180 U/L (ref 73–393)
LYMPHOCYTES # BLD AUTO: 1 10^3/UL (ref 1.5–5)
LYMPHOCYTES NFR BLD AUTO: 11.2 % (ref 24–44)
MAGNESIUM SERPL-MCNC: 2 MG/DL (ref 1.8–2.4)
MCH RBC QN AUTO: 29.6 PG (ref 27–33)
MCHC RBC AUTO-ENTMCNC: 31.7 G/DL (ref 32–36.5)
MCV RBC AUTO: 93.4 FL (ref 80–96)
MONOCYTES # BLD AUTO: 0.6 10^3/UL (ref 0–0.8)
MONOCYTES NFR BLD AUTO: 6.4 % (ref 2–8)
NEUTROPHILS # BLD AUTO: 7.3 10^3/UL (ref 1.5–8.5)
NEUTROPHILS NFR BLD AUTO: 81.6 % (ref 36–66)
OSMOLALITY UR: 519 MOSM/KG (ref 50–1400)
PLATELET # BLD AUTO: 183 10^3/UL (ref 150–450)
POTASSIUM SERPL-SCNC: 4.2 MEQ/L (ref 3.5–5.1)
PROT SERPL-MCNC: 7.4 GM/DL (ref 6.4–8.2)
PROT UR-MCNC: 70 MG/DL (ref 0–12)
RBC # BLD AUTO: 4.86 10^6/UL (ref 4–5.4)
RSV RNA NPH QL NAA+PROBE: NEGATIVE
SODIUM SERPL-SCNC: 134 MEQ/L (ref 136–145)
SODIUM UR-SCNC: 43 MEQ/L
T4 FREE SERPL-MCNC: 0.94 NG/DL (ref 0.76–1.46)
TSH SERPL DL<=0.005 MIU/L-ACNC: 2.06 UIU/ML (ref 0.36–3.74)
WBC # BLD AUTO: 8.9 10^3/UL (ref 4–10)

## 2022-11-08 RX ADMIN — INSULIN LISPRO SCH UNITS: 100 INJECTION, SOLUTION INTRAVENOUS; SUBCUTANEOUS at 22:25

## 2022-11-09 VITALS — OXYGEN SATURATION: 95 %

## 2022-11-09 VITALS — DIASTOLIC BLOOD PRESSURE: 70 MMHG | SYSTOLIC BLOOD PRESSURE: 128 MMHG

## 2022-11-09 VITALS — OXYGEN SATURATION: 94 %

## 2022-11-09 VITALS — OXYGEN SATURATION: 93 %

## 2022-11-09 VITALS — OXYGEN SATURATION: 92 %

## 2022-11-09 VITALS — OXYGEN SATURATION: 94 % | SYSTOLIC BLOOD PRESSURE: 104 MMHG | DIASTOLIC BLOOD PRESSURE: 55 MMHG

## 2022-11-09 LAB
APTT BLD: 31.8 SECONDS (ref 24.8–34.2)
BUN SERPL-MCNC: 22 MG/DL (ref 7–18)
CALCIUM SERPL-MCNC: 9 MG/DL (ref 8.8–10.2)
CHLORIDE SERPL-SCNC: 103 MEQ/L (ref 98–107)
CK MB CFR.DF SERPL CALC: 0.7
CK MB SERPL-MCNC: 1.2 NG/ML (ref ?–3.6)
CK SERPL-CCNC: 171 U/L (ref 26–192)
CO2 SERPL-SCNC: 27 MEQ/L (ref 21–32)
CREAT SERPL-MCNC: 1.7 MG/DL (ref 0.55–1.3)
GFR SERPL CREATININE-BSD FRML MDRD: 30.3 ML/MIN/{1.73_M2} (ref 32–?)
GLUCOSE SERPL-MCNC: 188 MG/DL (ref 70–100)
HCT VFR BLD AUTO: 40.4 % (ref 36–47)
HGB BLD-MCNC: 12.8 G/DL (ref 12–15.5)
INR PPP: 1.06
MAGNESIUM SERPL-MCNC: 1.9 MG/DL (ref 1.8–2.4)
MCH RBC QN AUTO: 29.9 PG (ref 27–33)
MCHC RBC AUTO-ENTMCNC: 31.7 G/DL (ref 32–36.5)
MCV RBC AUTO: 94.4 FL (ref 80–96)
PLATELET # BLD AUTO: 168 10^3/UL (ref 150–450)
POTASSIUM SERPL-SCNC: 3.4 MEQ/L (ref 3.5–5.1)
PROTHROMBIN TIME: 14 SECONDS (ref 12.5–14.5)
RBC # BLD AUTO: 4.28 10^6/UL (ref 4–5.4)
SODIUM SERPL-SCNC: 138 MEQ/L (ref 136–145)
WBC # BLD AUTO: 7.6 10^3/UL (ref 4–10)

## 2022-11-09 RX ADMIN — DOCUSATE SODIUM SCH MG: 100 CAPSULE, LIQUID FILLED ORAL at 09:23

## 2022-11-09 RX ADMIN — METOPROLOL SUCCINATE SCH MG: 25 TABLET, EXTENDED RELEASE ORAL at 18:12

## 2022-11-09 RX ADMIN — FUROSEMIDE SCH MG: 10 INJECTION, SOLUTION INTRAMUSCULAR; INTRAVENOUS at 09:24

## 2022-11-09 RX ADMIN — INSULIN DETEMIR SCH UNITS: 100 INJECTION, SOLUTION SUBCUTANEOUS at 09:24

## 2022-11-09 RX ADMIN — FUROSEMIDE SCH MG: 10 INJECTION, SOLUTION INTRAMUSCULAR; INTRAVENOUS at 18:12

## 2022-11-09 RX ADMIN — DOCUSATE SODIUM SCH MG: 100 CAPSULE, LIQUID FILLED ORAL at 20:42

## 2022-11-09 RX ADMIN — INSULIN LISPRO SCH UNITS: 100 INJECTION, SOLUTION INTRAVENOUS; SUBCUTANEOUS at 09:25

## 2022-11-09 RX ADMIN — INSULIN LISPRO SCH UNITS: 100 INJECTION, SOLUTION INTRAVENOUS; SUBCUTANEOUS at 20:39

## 2022-11-09 RX ADMIN — CLOPIDOGREL BISULFATE SCH MG: 75 TABLET ORAL at 18:11

## 2022-11-09 RX ADMIN — SODIUM CHLORIDE SCH UNITS: 4.5 INJECTION, SOLUTION INTRAVENOUS at 14:00

## 2022-11-09 RX ADMIN — SODIUM CHLORIDE SCH UNITS: 4.5 INJECTION, SOLUTION INTRAVENOUS at 22:10

## 2022-11-09 RX ADMIN — SERTRALINE HYDROCHLORIDE SCH MG: 50 TABLET ORAL at 18:12

## 2022-11-09 RX ADMIN — SODIUM CHLORIDE SCH UNITS: 4.5 INJECTION, SOLUTION INTRAVENOUS at 06:44

## 2022-11-09 RX ADMIN — INSULIN LISPRO SCH UNITS: 100 INJECTION, SOLUTION INTRAVENOUS; SUBCUTANEOUS at 12:48

## 2022-11-09 RX ADMIN — MIRTAZAPINE SCH MG: 15 TABLET, FILM COATED ORAL at 20:38

## 2022-11-09 RX ADMIN — INSULIN LISPRO SCH UNITS: 100 INJECTION, SOLUTION INTRAVENOUS; SUBCUTANEOUS at 17:11

## 2022-11-09 RX ADMIN — ATORVASTATIN CALCIUM SCH MG: 20 TABLET, FILM COATED ORAL at 18:12

## 2022-11-10 VITALS — OXYGEN SATURATION: 94 %

## 2022-11-10 VITALS — SYSTOLIC BLOOD PRESSURE: 130 MMHG | DIASTOLIC BLOOD PRESSURE: 79 MMHG | OXYGEN SATURATION: 93 %

## 2022-11-10 VITALS — OXYGEN SATURATION: 96 %

## 2022-11-10 VITALS — OXYGEN SATURATION: 94 % | DIASTOLIC BLOOD PRESSURE: 58 MMHG | SYSTOLIC BLOOD PRESSURE: 114 MMHG

## 2022-11-10 VITALS — SYSTOLIC BLOOD PRESSURE: 109 MMHG | DIASTOLIC BLOOD PRESSURE: 56 MMHG | OXYGEN SATURATION: 94 %

## 2022-11-10 VITALS — DIASTOLIC BLOOD PRESSURE: 73 MMHG | SYSTOLIC BLOOD PRESSURE: 129 MMHG | OXYGEN SATURATION: 93 %

## 2022-11-10 VITALS — OXYGEN SATURATION: 92 %

## 2022-11-10 VITALS — DIASTOLIC BLOOD PRESSURE: 79 MMHG | SYSTOLIC BLOOD PRESSURE: 131 MMHG | OXYGEN SATURATION: 95 %

## 2022-11-10 VITALS — OXYGEN SATURATION: 88 %

## 2022-11-10 VITALS — OXYGEN SATURATION: 86 %

## 2022-11-10 VITALS — OXYGEN SATURATION: 95 %

## 2022-11-10 VITALS — OXYGEN SATURATION: 94 % | SYSTOLIC BLOOD PRESSURE: 143 MMHG | DIASTOLIC BLOOD PRESSURE: 70 MMHG

## 2022-11-10 VITALS — OXYGEN SATURATION: 93 %

## 2022-11-10 LAB
BUN SERPL-MCNC: 26 MG/DL (ref 7–18)
CALCIUM SERPL-MCNC: 8.9 MG/DL (ref 8.8–10.2)
CHLORIDE SERPL-SCNC: 106 MEQ/L (ref 98–107)
CO2 SERPL-SCNC: 28 MEQ/L (ref 21–32)
CREAT SERPL-MCNC: 1.8 MG/DL (ref 0.55–1.3)
GFR SERPL CREATININE-BSD FRML MDRD: 28.4 ML/MIN/{1.73_M2} (ref 32–?)
GLUCOSE SERPL-MCNC: 165 MG/DL (ref 70–100)
HCT VFR BLD AUTO: 39.5 % (ref 36–47)
HGB BLD-MCNC: 12.8 G/DL (ref 12–15.5)
MAGNESIUM SERPL-MCNC: 2.1 MG/DL (ref 1.8–2.4)
MCH RBC QN AUTO: 30.4 PG (ref 27–33)
MCHC RBC AUTO-ENTMCNC: 32.4 G/DL (ref 32–36.5)
MCV RBC AUTO: 93.8 FL (ref 80–96)
PLATELET # BLD AUTO: 172 10^3/UL (ref 150–450)
POTASSIUM SERPL-SCNC: 3.4 MEQ/L (ref 3.5–5.1)
RBC # BLD AUTO: 4.21 10^6/UL (ref 4–5.4)
SODIUM SERPL-SCNC: 139 MEQ/L (ref 136–145)
WBC # BLD AUTO: 5.4 10^3/UL (ref 4–10)

## 2022-11-10 RX ADMIN — INSULIN LISPRO SCH UNITS: 100 INJECTION, SOLUTION INTRAVENOUS; SUBCUTANEOUS at 17:04

## 2022-11-10 RX ADMIN — INSULIN LISPRO SCH UNITS: 100 INJECTION, SOLUTION INTRAVENOUS; SUBCUTANEOUS at 12:04

## 2022-11-10 RX ADMIN — INSULIN LISPRO SCH UNITS: 100 INJECTION, SOLUTION INTRAVENOUS; SUBCUTANEOUS at 09:05

## 2022-11-10 RX ADMIN — SODIUM CHLORIDE SCH UNITS: 4.5 INJECTION, SOLUTION INTRAVENOUS at 14:29

## 2022-11-10 RX ADMIN — DOCUSATE SODIUM SCH MG: 100 CAPSULE, LIQUID FILLED ORAL at 21:00

## 2022-11-10 RX ADMIN — METOPROLOL SUCCINATE SCH MG: 25 TABLET, EXTENDED RELEASE ORAL at 17:04

## 2022-11-10 RX ADMIN — ATORVASTATIN CALCIUM SCH MG: 20 TABLET, FILM COATED ORAL at 17:03

## 2022-11-10 RX ADMIN — SERTRALINE HYDROCHLORIDE SCH MG: 50 TABLET ORAL at 17:04

## 2022-11-10 RX ADMIN — SODIUM CHLORIDE SCH UNITS: 4.5 INJECTION, SOLUTION INTRAVENOUS at 06:32

## 2022-11-10 RX ADMIN — SODIUM CHLORIDE SCH UNITS: 4.5 INJECTION, SOLUTION INTRAVENOUS at 21:00

## 2022-11-10 RX ADMIN — INSULIN DETEMIR SCH UNITS: 100 INJECTION, SOLUTION SUBCUTANEOUS at 09:09

## 2022-11-10 RX ADMIN — INSULIN LISPRO SCH UNITS: 100 INJECTION, SOLUTION INTRAVENOUS; SUBCUTANEOUS at 21:00

## 2022-11-10 RX ADMIN — CLOPIDOGREL BISULFATE SCH MG: 75 TABLET ORAL at 17:04

## 2022-11-10 RX ADMIN — DOCUSATE SODIUM SCH MG: 100 CAPSULE, LIQUID FILLED ORAL at 09:08

## 2022-11-10 RX ADMIN — MIRTAZAPINE SCH MG: 15 TABLET, FILM COATED ORAL at 21:00

## 2022-11-11 VITALS — OXYGEN SATURATION: 94 %

## 2022-11-11 VITALS — OXYGEN SATURATION: 92 %

## 2022-11-11 VITALS — OXYGEN SATURATION: 91 % | DIASTOLIC BLOOD PRESSURE: 73 MMHG | SYSTOLIC BLOOD PRESSURE: 139 MMHG

## 2022-11-11 VITALS — OXYGEN SATURATION: 95 %

## 2022-11-11 VITALS — DIASTOLIC BLOOD PRESSURE: 62 MMHG | SYSTOLIC BLOOD PRESSURE: 120 MMHG

## 2022-11-11 VITALS — OXYGEN SATURATION: 93 %

## 2022-11-11 VITALS — DIASTOLIC BLOOD PRESSURE: 65 MMHG | SYSTOLIC BLOOD PRESSURE: 124 MMHG

## 2022-11-11 VITALS — OXYGEN SATURATION: 91 %

## 2022-11-11 VITALS — OXYGEN SATURATION: 94 % | DIASTOLIC BLOOD PRESSURE: 61 MMHG | SYSTOLIC BLOOD PRESSURE: 129 MMHG

## 2022-11-11 VITALS — SYSTOLIC BLOOD PRESSURE: 148 MMHG | OXYGEN SATURATION: 93 % | DIASTOLIC BLOOD PRESSURE: 70 MMHG

## 2022-11-11 VITALS — SYSTOLIC BLOOD PRESSURE: 138 MMHG | OXYGEN SATURATION: 93 % | DIASTOLIC BLOOD PRESSURE: 70 MMHG

## 2022-11-11 VITALS — OXYGEN SATURATION: 89 %

## 2022-11-11 LAB
BUN SERPL-MCNC: 29 MG/DL (ref 7–18)
CALCIUM SERPL-MCNC: 8.9 MG/DL (ref 8.8–10.2)
CHLORIDE SERPL-SCNC: 105 MEQ/L (ref 98–107)
CO2 SERPL-SCNC: 26 MEQ/L (ref 21–32)
CREAT SERPL-MCNC: 1.48 MG/DL (ref 0.55–1.3)
GFR SERPL CREATININE-BSD FRML MDRD: 35.6 ML/MIN/{1.73_M2} (ref 32–?)
GLUCOSE SERPL-MCNC: 111 MG/DL (ref 70–100)
HCT VFR BLD AUTO: 36.8 % (ref 36–47)
HGB BLD-MCNC: 12 G/DL (ref 12–15.5)
MAGNESIUM SERPL-MCNC: 1.9 MG/DL (ref 1.8–2.4)
MCH RBC QN AUTO: 30.3 PG (ref 27–33)
MCHC RBC AUTO-ENTMCNC: 32.6 G/DL (ref 32–36.5)
MCV RBC AUTO: 92.9 FL (ref 80–96)
PLATELET # BLD AUTO: 196 10^3/UL (ref 150–450)
POTASSIUM SERPL-SCNC: 3.8 MEQ/L (ref 3.5–5.1)
RBC # BLD AUTO: 3.96 10^6/UL (ref 4–5.4)
SODIUM SERPL-SCNC: 137 MEQ/L (ref 136–145)
WBC # BLD AUTO: 7.6 10^3/UL (ref 4–10)

## 2022-11-11 RX ADMIN — INSULIN DETEMIR SCH UNITS: 100 INJECTION, SOLUTION SUBCUTANEOUS at 08:35

## 2022-11-11 RX ADMIN — DOCUSATE SODIUM SCH MG: 100 CAPSULE, LIQUID FILLED ORAL at 08:35

## 2022-11-11 RX ADMIN — SODIUM CHLORIDE SCH UNITS: 4.5 INJECTION, SOLUTION INTRAVENOUS at 14:20

## 2022-11-11 RX ADMIN — INSULIN LISPRO SCH UNITS: 100 INJECTION, SOLUTION INTRAVENOUS; SUBCUTANEOUS at 20:49

## 2022-11-11 RX ADMIN — ATORVASTATIN CALCIUM SCH MG: 20 TABLET, FILM COATED ORAL at 16:57

## 2022-11-11 RX ADMIN — INSULIN LISPRO SCH UNITS: 100 INJECTION, SOLUTION INTRAVENOUS; SUBCUTANEOUS at 17:51

## 2022-11-11 RX ADMIN — SERTRALINE HYDROCHLORIDE SCH MG: 50 TABLET ORAL at 16:56

## 2022-11-11 RX ADMIN — SODIUM CHLORIDE SCH UNITS: 4.5 INJECTION, SOLUTION INTRAVENOUS at 20:49

## 2022-11-11 RX ADMIN — METOPROLOL SUCCINATE SCH MG: 25 TABLET, EXTENDED RELEASE ORAL at 16:56

## 2022-11-11 RX ADMIN — SODIUM CHLORIDE SCH UNITS: 4.5 INJECTION, SOLUTION INTRAVENOUS at 05:56

## 2022-11-11 RX ADMIN — DOCUSATE SODIUM SCH MG: 100 CAPSULE, LIQUID FILLED ORAL at 20:48

## 2022-11-11 RX ADMIN — CLOPIDOGREL BISULFATE SCH MG: 75 TABLET ORAL at 16:57

## 2022-11-11 RX ADMIN — INSULIN LISPRO SCH UNITS: 100 INJECTION, SOLUTION INTRAVENOUS; SUBCUTANEOUS at 12:52

## 2022-11-11 RX ADMIN — MIRTAZAPINE SCH MG: 15 TABLET, FILM COATED ORAL at 20:48

## 2022-11-11 RX ADMIN — INSULIN LISPRO SCH UNITS: 100 INJECTION, SOLUTION INTRAVENOUS; SUBCUTANEOUS at 08:35

## 2022-11-12 VITALS — OXYGEN SATURATION: 91 %

## 2022-11-12 VITALS — DIASTOLIC BLOOD PRESSURE: 58 MMHG | SYSTOLIC BLOOD PRESSURE: 122 MMHG

## 2022-11-12 VITALS — OXYGEN SATURATION: 94 %

## 2022-11-12 VITALS — SYSTOLIC BLOOD PRESSURE: 138 MMHG | DIASTOLIC BLOOD PRESSURE: 63 MMHG | OXYGEN SATURATION: 90 %

## 2022-11-12 VITALS — OXYGEN SATURATION: 91 % | DIASTOLIC BLOOD PRESSURE: 57 MMHG | SYSTOLIC BLOOD PRESSURE: 116 MMHG

## 2022-11-12 VITALS — SYSTOLIC BLOOD PRESSURE: 96 MMHG | DIASTOLIC BLOOD PRESSURE: 52 MMHG

## 2022-11-12 VITALS — SYSTOLIC BLOOD PRESSURE: 109 MMHG | DIASTOLIC BLOOD PRESSURE: 55 MMHG

## 2022-11-12 VITALS — OXYGEN SATURATION: 93 %

## 2022-11-12 VITALS — DIASTOLIC BLOOD PRESSURE: 61 MMHG | SYSTOLIC BLOOD PRESSURE: 115 MMHG

## 2022-11-12 VITALS — SYSTOLIC BLOOD PRESSURE: 104 MMHG | DIASTOLIC BLOOD PRESSURE: 68 MMHG

## 2022-11-12 VITALS — OXYGEN SATURATION: 95 %

## 2022-11-12 LAB
BASE EXCESS BLDA CALC-SCNC: -1.6 MMOL/L (ref -2–2)
BUN SERPL-MCNC: 30 MG/DL (ref 7–18)
CALCIUM SERPL-MCNC: 8.8 MG/DL (ref 8.8–10.2)
CHLORIDE SERPL-SCNC: 103 MEQ/L (ref 98–107)
CO2 BLDA CALC-SCNC: 24.3 MEQ/L (ref 23–31)
CO2 SERPL-SCNC: 24 MEQ/L (ref 21–32)
CREAT SERPL-MCNC: 1.61 MG/DL (ref 0.55–1.3)
GFR SERPL CREATININE-BSD FRML MDRD: 32.3 ML/MIN/{1.73_M2} (ref 32–?)
GLUCOSE SERPL-MCNC: 232 MG/DL (ref 70–100)
HCO3 BLDA-SCNC: 23.1 MEQ/L (ref 22–26)
HCO3 STD BLDA-SCNC: 23.1 MEQ/L (ref 22–26)
HCT VFR BLD AUTO: 36.4 % (ref 36–47)
HGB BLD-MCNC: 11.7 G/DL (ref 12–15.5)
MAGNESIUM SERPL-MCNC: 1.9 MG/DL (ref 1.8–2.4)
MCH RBC QN AUTO: 29.8 PG (ref 27–33)
MCHC RBC AUTO-ENTMCNC: 32.1 G/DL (ref 32–36.5)
MCV RBC AUTO: 92.6 FL (ref 80–96)
PCO2 BLDA: 39 MMHG (ref 35–45)
PH BLDA: 7.39 UNITS (ref 7.35–7.45)
PLATELET # BLD AUTO: 188 10^3/UL (ref 150–450)
PO2 BLDA: 77.9 MMHG (ref 75–100)
POTASSIUM SERPL-SCNC: 3.7 MEQ/L (ref 3.5–5.1)
RBC # BLD AUTO: 3.93 10^6/UL (ref 4–5.4)
SAO2 % BLDA: 95.8 % (ref 95–99)
SODIUM SERPL-SCNC: 134 MEQ/L (ref 136–145)
WBC # BLD AUTO: 8.3 10^3/UL (ref 4–10)

## 2022-11-12 RX ADMIN — INSULIN LISPRO SCH UNITS: 100 INJECTION, SOLUTION INTRAVENOUS; SUBCUTANEOUS at 16:58

## 2022-11-12 RX ADMIN — SODIUM CHLORIDE SCH UNITS: 4.5 INJECTION, SOLUTION INTRAVENOUS at 06:02

## 2022-11-12 RX ADMIN — CEFTRIAXONE SCH MLS/HR: 2 INJECTION, POWDER, FOR SOLUTION INTRAMUSCULAR; INTRAVENOUS at 14:16

## 2022-11-12 RX ADMIN — SERTRALINE HYDROCHLORIDE SCH MG: 50 TABLET ORAL at 16:55

## 2022-11-12 RX ADMIN — INSULIN LISPRO SCH UNITS: 100 INJECTION, SOLUTION INTRAVENOUS; SUBCUTANEOUS at 09:02

## 2022-11-12 RX ADMIN — ATORVASTATIN CALCIUM SCH MG: 20 TABLET, FILM COATED ORAL at 16:51

## 2022-11-12 RX ADMIN — SODIUM CHLORIDE SCH UNITS: 4.5 INJECTION, SOLUTION INTRAVENOUS at 21:01

## 2022-11-12 RX ADMIN — INSULIN LISPRO SCH UNITS: 100 INJECTION, SOLUTION INTRAVENOUS; SUBCUTANEOUS at 20:20

## 2022-11-12 RX ADMIN — SODIUM CHLORIDE SCH UNITS: 4.5 INJECTION, SOLUTION INTRAVENOUS at 14:15

## 2022-11-12 RX ADMIN — CLOPIDOGREL BISULFATE SCH MG: 75 TABLET ORAL at 16:51

## 2022-11-12 RX ADMIN — DOCUSATE SODIUM SCH MG: 100 CAPSULE, LIQUID FILLED ORAL at 21:01

## 2022-11-12 RX ADMIN — DEXTROSE MONOHYDRATE SCH MLS/HR: 5 INJECTION INTRAVENOUS at 16:51

## 2022-11-12 RX ADMIN — METOPROLOL SUCCINATE SCH MG: 25 TABLET, EXTENDED RELEASE ORAL at 16:53

## 2022-11-12 RX ADMIN — INSULIN DETEMIR SCH UNITS: 100 INJECTION, SOLUTION SUBCUTANEOUS at 09:01

## 2022-11-12 RX ADMIN — INSULIN LISPRO SCH UNITS: 100 INJECTION, SOLUTION INTRAVENOUS; SUBCUTANEOUS at 11:51

## 2022-11-12 RX ADMIN — PROBIOTIC PRODUCT - TAB SCH EA: TAB at 16:58

## 2022-11-12 RX ADMIN — DOCUSATE SODIUM SCH MG: 100 CAPSULE, LIQUID FILLED ORAL at 08:59

## 2022-11-13 VITALS — SYSTOLIC BLOOD PRESSURE: 118 MMHG | DIASTOLIC BLOOD PRESSURE: 62 MMHG

## 2022-11-13 VITALS — DIASTOLIC BLOOD PRESSURE: 69 MMHG | SYSTOLIC BLOOD PRESSURE: 118 MMHG

## 2022-11-13 VITALS — DIASTOLIC BLOOD PRESSURE: 67 MMHG | SYSTOLIC BLOOD PRESSURE: 120 MMHG

## 2022-11-13 VITALS — DIASTOLIC BLOOD PRESSURE: 73 MMHG | SYSTOLIC BLOOD PRESSURE: 135 MMHG

## 2022-11-13 VITALS — SYSTOLIC BLOOD PRESSURE: 131 MMHG | DIASTOLIC BLOOD PRESSURE: 70 MMHG

## 2022-11-13 VITALS — SYSTOLIC BLOOD PRESSURE: 111 MMHG | DIASTOLIC BLOOD PRESSURE: 70 MMHG

## 2022-11-13 VITALS — SYSTOLIC BLOOD PRESSURE: 105 MMHG | DIASTOLIC BLOOD PRESSURE: 56 MMHG

## 2022-11-13 LAB
BUN SERPL-MCNC: 34 MG/DL (ref 7–18)
CALCIUM SERPL-MCNC: 8.6 MG/DL (ref 8.8–10.2)
CHLORIDE SERPL-SCNC: 105 MEQ/L (ref 98–107)
CO2 SERPL-SCNC: 26 MEQ/L (ref 21–32)
CREAT SERPL-MCNC: 1.63 MG/DL (ref 0.55–1.3)
GFR SERPL CREATININE-BSD FRML MDRD: 31.8 ML/MIN/{1.73_M2} (ref 32–?)
GLUCOSE SERPL-MCNC: 124 MG/DL (ref 70–100)
HCT VFR BLD AUTO: 34.7 % (ref 36–47)
HGB BLD-MCNC: 11.4 G/DL (ref 12–15.5)
MAGNESIUM SERPL-MCNC: 2 MG/DL (ref 1.8–2.4)
MCH RBC QN AUTO: 30.6 PG (ref 27–33)
MCHC RBC AUTO-ENTMCNC: 32.9 G/DL (ref 32–36.5)
MCV RBC AUTO: 93.3 FL (ref 80–96)
PLATELET # BLD AUTO: 186 10^3/UL (ref 150–450)
POTASSIUM SERPL-SCNC: 3.6 MEQ/L (ref 3.5–5.1)
RBC # BLD AUTO: 3.72 10^6/UL (ref 4–5.4)
SODIUM SERPL-SCNC: 136 MEQ/L (ref 136–145)
WBC # BLD AUTO: 7.7 10^3/UL (ref 4–10)

## 2022-11-13 RX ADMIN — ATORVASTATIN CALCIUM SCH MG: 20 TABLET, FILM COATED ORAL at 16:48

## 2022-11-13 RX ADMIN — SODIUM CHLORIDE SCH UNITS: 4.5 INJECTION, SOLUTION INTRAVENOUS at 05:02

## 2022-11-13 RX ADMIN — INSULIN DETEMIR SCH UNITS: 100 INJECTION, SOLUTION SUBCUTANEOUS at 08:21

## 2022-11-13 RX ADMIN — INSULIN LISPRO SCH UNITS: 100 INJECTION, SOLUTION INTRAVENOUS; SUBCUTANEOUS at 07:30

## 2022-11-13 RX ADMIN — PROBIOTIC PRODUCT - TAB SCH EA: TAB at 08:19

## 2022-11-13 RX ADMIN — DOCUSATE SODIUM SCH MG: 100 CAPSULE, LIQUID FILLED ORAL at 08:19

## 2022-11-13 RX ADMIN — DEXTROSE MONOHYDRATE SCH MLS/HR: 5 INJECTION INTRAVENOUS at 05:02

## 2022-11-13 RX ADMIN — SODIUM CHLORIDE SCH UNITS: 4.5 INJECTION, SOLUTION INTRAVENOUS at 12:40

## 2022-11-13 RX ADMIN — CEFTRIAXONE SCH MLS/HR: 2 INJECTION, POWDER, FOR SOLUTION INTRAMUSCULAR; INTRAVENOUS at 13:50

## 2022-11-13 RX ADMIN — DEXTROSE MONOHYDRATE SCH MLS/HR: 5 INJECTION INTRAVENOUS at 16:47

## 2022-11-13 RX ADMIN — CLOPIDOGREL BISULFATE SCH MG: 75 TABLET ORAL at 16:49

## 2022-11-13 RX ADMIN — INSULIN LISPRO SCH UNITS: 100 INJECTION, SOLUTION INTRAVENOUS; SUBCUTANEOUS at 12:40

## 2022-11-13 RX ADMIN — SERTRALINE HYDROCHLORIDE SCH MG: 50 TABLET ORAL at 16:49

## 2022-11-13 RX ADMIN — PROBIOTIC PRODUCT - TAB SCH EA: TAB at 16:48

## 2022-11-13 RX ADMIN — INSULIN LISPRO SCH UNITS: 100 INJECTION, SOLUTION INTRAVENOUS; SUBCUTANEOUS at 16:48

## 2022-11-13 RX ADMIN — DOCUSATE SODIUM SCH MG: 100 CAPSULE, LIQUID FILLED ORAL at 21:17

## 2022-11-13 RX ADMIN — INSULIN LISPRO SCH UNITS: 100 INJECTION, SOLUTION INTRAVENOUS; SUBCUTANEOUS at 21:00

## 2022-11-13 RX ADMIN — METOPROLOL SUCCINATE SCH MG: 25 TABLET, EXTENDED RELEASE ORAL at 16:49

## 2022-11-13 RX ADMIN — SODIUM CHLORIDE SCH UNITS: 4.5 INJECTION, SOLUTION INTRAVENOUS at 21:17

## 2022-11-14 VITALS — DIASTOLIC BLOOD PRESSURE: 67 MMHG | SYSTOLIC BLOOD PRESSURE: 118 MMHG

## 2022-11-14 VITALS — SYSTOLIC BLOOD PRESSURE: 118 MMHG | DIASTOLIC BLOOD PRESSURE: 65 MMHG

## 2022-11-14 VITALS — SYSTOLIC BLOOD PRESSURE: 119 MMHG | DIASTOLIC BLOOD PRESSURE: 67 MMHG

## 2022-11-14 VITALS — OXYGEN SATURATION: 94 %

## 2022-11-14 LAB
BUN SERPL-MCNC: 34 MG/DL (ref 7–18)
CALCIUM SERPL-MCNC: 9 MG/DL (ref 8.8–10.2)
CHLORIDE SERPL-SCNC: 105 MEQ/L (ref 98–107)
CO2 SERPL-SCNC: 27 MEQ/L (ref 21–32)
CREAT SERPL-MCNC: 1.68 MG/DL (ref 0.55–1.3)
CRP SERPL-MCNC: 12.3 MG/DL (ref 0–0.3)
ERYTHROCYTE [SEDIMENTATION RATE] IN BLOOD BY WESTERGREN METHOD: 80 MM/HR (ref 0–30)
GFR SERPL CREATININE-BSD FRML MDRD: 30.8 ML/MIN/{1.73_M2} (ref 32–?)
GLUCOSE SERPL-MCNC: 79 MG/DL (ref 70–100)
HCT VFR BLD AUTO: 34.9 % (ref 36–47)
HGB BLD-MCNC: 11.2 G/DL (ref 12–15.5)
MAGNESIUM SERPL-MCNC: 2.1 MG/DL (ref 1.8–2.4)
MCH RBC QN AUTO: 29.9 PG (ref 27–33)
MCHC RBC AUTO-ENTMCNC: 32.1 G/DL (ref 32–36.5)
MCV RBC AUTO: 93.1 FL (ref 80–96)
PLATELET # BLD AUTO: 218 10^3/UL (ref 150–450)
POTASSIUM SERPL-SCNC: 3.9 MEQ/L (ref 3.5–5.1)
RBC # BLD AUTO: 3.75 10^6/UL (ref 4–5.4)
SODIUM SERPL-SCNC: 137 MEQ/L (ref 136–145)
WBC # BLD AUTO: 7.5 10^3/UL (ref 4–10)

## 2022-11-14 RX ADMIN — DOCUSATE SODIUM SCH MG: 100 CAPSULE, LIQUID FILLED ORAL at 08:14

## 2022-11-14 RX ADMIN — SODIUM CHLORIDE SCH UNITS: 4.5 INJECTION, SOLUTION INTRAVENOUS at 05:31

## 2022-11-14 RX ADMIN — METOPROLOL SUCCINATE SCH MG: 25 TABLET, EXTENDED RELEASE ORAL at 17:08

## 2022-11-14 RX ADMIN — ATORVASTATIN CALCIUM SCH MG: 20 TABLET, FILM COATED ORAL at 17:08

## 2022-11-14 RX ADMIN — SODIUM CHLORIDE SCH UNITS: 4.5 INJECTION, SOLUTION INTRAVENOUS at 14:17

## 2022-11-14 RX ADMIN — INSULIN LISPRO SCH UNITS: 100 INJECTION, SOLUTION INTRAVENOUS; SUBCUTANEOUS at 17:12

## 2022-11-14 RX ADMIN — INSULIN DETEMIR SCH UNITS: 100 INJECTION, SOLUTION SUBCUTANEOUS at 08:15

## 2022-11-14 RX ADMIN — INSULIN LISPRO SCH UNITS: 100 INJECTION, SOLUTION INTRAVENOUS; SUBCUTANEOUS at 20:29

## 2022-11-14 RX ADMIN — PROBIOTIC PRODUCT - TAB SCH EA: TAB at 08:14

## 2022-11-14 RX ADMIN — DEXTROSE MONOHYDRATE SCH MLS/HR: 5 INJECTION INTRAVENOUS at 05:31

## 2022-11-14 RX ADMIN — CLOPIDOGREL BISULFATE SCH MG: 75 TABLET ORAL at 17:08

## 2022-11-14 RX ADMIN — SODIUM CHLORIDE SCH UNITS: 4.5 INJECTION, SOLUTION INTRAVENOUS at 21:12

## 2022-11-14 RX ADMIN — PROBIOTIC PRODUCT - TAB SCH EA: TAB at 17:08

## 2022-11-14 RX ADMIN — DOCUSATE SODIUM SCH MG: 100 CAPSULE, LIQUID FILLED ORAL at 21:11

## 2022-11-14 RX ADMIN — INSULIN LISPRO SCH UNITS: 100 INJECTION, SOLUTION INTRAVENOUS; SUBCUTANEOUS at 07:30

## 2022-11-14 RX ADMIN — SERTRALINE HYDROCHLORIDE SCH MG: 50 TABLET ORAL at 17:08

## 2022-11-14 RX ADMIN — INSULIN LISPRO SCH UNITS: 100 INJECTION, SOLUTION INTRAVENOUS; SUBCUTANEOUS at 14:28

## 2022-11-15 VITALS — SYSTOLIC BLOOD PRESSURE: 140 MMHG | DIASTOLIC BLOOD PRESSURE: 88 MMHG

## 2022-11-15 VITALS — DIASTOLIC BLOOD PRESSURE: 69 MMHG | SYSTOLIC BLOOD PRESSURE: 128 MMHG

## 2022-11-15 VITALS — DIASTOLIC BLOOD PRESSURE: 76 MMHG | SYSTOLIC BLOOD PRESSURE: 129 MMHG

## 2022-11-15 VITALS — SYSTOLIC BLOOD PRESSURE: 108 MMHG | DIASTOLIC BLOOD PRESSURE: 58 MMHG

## 2022-11-15 LAB
BUN SERPL-MCNC: 27 MG/DL (ref 7–18)
CALCIUM SERPL-MCNC: 9.2 MG/DL (ref 8.8–10.2)
CHLORIDE SERPL-SCNC: 107 MEQ/L (ref 98–107)
CO2 SERPL-SCNC: 24 MEQ/L (ref 21–32)
CREAT SERPL-MCNC: 1.23 MG/DL (ref 0.55–1.3)
GFR SERPL CREATININE-BSD FRML MDRD: 44.1 ML/MIN/{1.73_M2} (ref 32–?)
GLUCOSE SERPL-MCNC: 78 MG/DL (ref 70–100)
HCT VFR BLD AUTO: 36.3 % (ref 36–47)
HGB BLD-MCNC: 11.6 G/DL (ref 12–15.5)
MAGNESIUM SERPL-MCNC: 2 MG/DL (ref 1.8–2.4)
MCH RBC QN AUTO: 29.6 PG (ref 27–33)
MCHC RBC AUTO-ENTMCNC: 32 G/DL (ref 32–36.5)
MCV RBC AUTO: 92.6 FL (ref 80–96)
PLATELET # BLD AUTO: 237 10^3/UL (ref 150–450)
POTASSIUM SERPL-SCNC: 3.7 MEQ/L (ref 3.5–5.1)
RBC # BLD AUTO: 3.92 10^6/UL (ref 4–5.4)
SODIUM SERPL-SCNC: 138 MEQ/L (ref 136–145)
WBC # BLD AUTO: 7.2 10^3/UL (ref 4–10)

## 2022-11-15 RX ADMIN — SODIUM CHLORIDE SCH UNITS: 4.5 INJECTION, SOLUTION INTRAVENOUS at 21:08

## 2022-11-15 RX ADMIN — SODIUM CHLORIDE SCH UNITS: 4.5 INJECTION, SOLUTION INTRAVENOUS at 06:18

## 2022-11-15 RX ADMIN — DOCUSATE SODIUM SCH MG: 100 CAPSULE, LIQUID FILLED ORAL at 08:14

## 2022-11-15 RX ADMIN — PROBIOTIC PRODUCT - TAB SCH EA: TAB at 08:14

## 2022-11-15 RX ADMIN — METOPROLOL SUCCINATE SCH MG: 25 TABLET, EXTENDED RELEASE ORAL at 17:31

## 2022-11-15 RX ADMIN — CLOPIDOGREL BISULFATE SCH MG: 75 TABLET ORAL at 17:28

## 2022-11-15 RX ADMIN — INSULIN LISPRO SCH UNITS: 100 INJECTION, SOLUTION INTRAVENOUS; SUBCUTANEOUS at 12:20

## 2022-11-15 RX ADMIN — SODIUM CHLORIDE SCH UNITS: 4.5 INJECTION, SOLUTION INTRAVENOUS at 12:20

## 2022-11-15 RX ADMIN — INSULIN DETEMIR SCH UNITS: 100 INJECTION, SOLUTION SUBCUTANEOUS at 08:54

## 2022-11-15 RX ADMIN — INSULIN LISPRO SCH UNITS: 100 INJECTION, SOLUTION INTRAVENOUS; SUBCUTANEOUS at 08:54

## 2022-11-15 RX ADMIN — DOCUSATE SODIUM SCH MG: 100 CAPSULE, LIQUID FILLED ORAL at 21:08

## 2022-11-15 RX ADMIN — ATORVASTATIN CALCIUM SCH MG: 20 TABLET, FILM COATED ORAL at 17:29

## 2022-11-15 RX ADMIN — INSULIN LISPRO SCH UNITS: 100 INJECTION, SOLUTION INTRAVENOUS; SUBCUTANEOUS at 17:28

## 2022-11-15 RX ADMIN — SERTRALINE HYDROCHLORIDE SCH MG: 50 TABLET ORAL at 17:28

## 2022-11-15 RX ADMIN — PROBIOTIC PRODUCT - TAB SCH EA: TAB at 17:29

## 2022-11-15 RX ADMIN — INSULIN LISPRO SCH UNITS: 100 INJECTION, SOLUTION INTRAVENOUS; SUBCUTANEOUS at 21:00

## 2022-11-16 VITALS — DIASTOLIC BLOOD PRESSURE: 73 MMHG | SYSTOLIC BLOOD PRESSURE: 121 MMHG

## 2022-11-16 VITALS — DIASTOLIC BLOOD PRESSURE: 61 MMHG | SYSTOLIC BLOOD PRESSURE: 110 MMHG

## 2022-11-16 VITALS — DIASTOLIC BLOOD PRESSURE: 71 MMHG | SYSTOLIC BLOOD PRESSURE: 122 MMHG

## 2022-11-16 LAB
BASOPHILS # BLD AUTO: 0.1 10^3/UL (ref 0–0.2)
BASOPHILS NFR BLD AUTO: 0.8 % (ref 0–1)
BUN SERPL-MCNC: 28 MG/DL (ref 9–23)
CALCIUM SERPL-MCNC: 8.6 MG/DL (ref 8.3–10.6)
CHLORIDE SERPL-SCNC: 104 MMOL/L (ref 98–107)
CO2 SERPL-SCNC: 24 MMOL/L (ref 20–31)
CREAT SERPL-MCNC: 1.31 MG/DL (ref 0.55–1.3)
CRP SERPL-MCNC: 7.7 MG/DL (ref ?–1)
EOSINOPHIL # BLD AUTO: 0.2 10^3/UL (ref 0–0.5)
EOSINOPHIL NFR BLD AUTO: 2.9 % (ref 0–3)
GFR SERPL CREATININE-BSD FRML MDRD: 41 ML/MIN/{1.73_M2} (ref 32–?)
GLUCOSE SERPL-MCNC: 92 MG/DL (ref 74–106)
HCT VFR BLD AUTO: 34.8 % (ref 36–47)
HGB BLD-MCNC: 11.3 G/DL (ref 12–15.5)
LYMPHOCYTES # BLD AUTO: 1.1 10^3/UL (ref 1.5–5)
LYMPHOCYTES NFR BLD AUTO: 16.2 % (ref 24–44)
MCH RBC QN AUTO: 29.9 PG (ref 27–33)
MCHC RBC AUTO-ENTMCNC: 32.5 G/DL (ref 32–36.5)
MCV RBC AUTO: 92.1 FL (ref 80–96)
MONOCYTES # BLD AUTO: 0.5 10^3/UL (ref 0–0.8)
MONOCYTES NFR BLD AUTO: 8.2 % (ref 2–8)
NEUTROPHILS # BLD AUTO: 4.7 10^3/UL (ref 1.5–8.5)
NEUTROPHILS NFR BLD AUTO: 71.4 % (ref 36–66)
PLATELET # BLD AUTO: 245 10^3/UL (ref 150–450)
POTASSIUM SERPL-SCNC: 3.8 MMOL/L (ref 3.5–5.1)
RBC # BLD AUTO: 3.78 10^6/UL (ref 4–5.4)
SODIUM SERPL-SCNC: 139 MMOL/L (ref 136–145)
WBC # BLD AUTO: 6.6 10^3/UL (ref 4–10)

## 2022-11-16 RX ADMIN — CLOPIDOGREL BISULFATE SCH MG: 75 TABLET ORAL at 17:00

## 2022-11-16 RX ADMIN — DOCUSATE SODIUM SCH MG: 100 CAPSULE, LIQUID FILLED ORAL at 09:40

## 2022-11-16 RX ADMIN — DOCUSATE SODIUM SCH MG: 100 CAPSULE, LIQUID FILLED ORAL at 22:17

## 2022-11-16 RX ADMIN — SODIUM CHLORIDE SCH UNITS: 4.5 INJECTION, SOLUTION INTRAVENOUS at 05:13

## 2022-11-16 RX ADMIN — INSULIN LISPRO SCH UNITS: 100 INJECTION, SOLUTION INTRAVENOUS; SUBCUTANEOUS at 21:00

## 2022-11-16 RX ADMIN — PROBIOTIC PRODUCT - TAB SCH EA: TAB at 17:00

## 2022-11-16 RX ADMIN — ATORVASTATIN CALCIUM SCH MG: 20 TABLET, FILM COATED ORAL at 17:01

## 2022-11-16 RX ADMIN — INSULIN LISPRO SCH UNITS: 100 INJECTION, SOLUTION INTRAVENOUS; SUBCUTANEOUS at 12:54

## 2022-11-16 RX ADMIN — INSULIN DETEMIR SCH UNITS: 100 INJECTION, SOLUTION SUBCUTANEOUS at 09:40

## 2022-11-16 RX ADMIN — SERTRALINE HYDROCHLORIDE SCH MG: 50 TABLET ORAL at 17:00

## 2022-11-16 RX ADMIN — SODIUM CHLORIDE SCH UNITS: 4.5 INJECTION, SOLUTION INTRAVENOUS at 22:17

## 2022-11-16 RX ADMIN — INSULIN LISPRO SCH UNITS: 100 INJECTION, SOLUTION INTRAVENOUS; SUBCUTANEOUS at 17:02

## 2022-11-16 RX ADMIN — METOPROLOL SUCCINATE SCH MG: 25 TABLET, EXTENDED RELEASE ORAL at 17:00

## 2022-11-16 RX ADMIN — PROBIOTIC PRODUCT - TAB SCH EA: TAB at 09:40

## 2022-11-16 RX ADMIN — INSULIN LISPRO SCH UNITS: 100 INJECTION, SOLUTION INTRAVENOUS; SUBCUTANEOUS at 08:00

## 2022-11-16 RX ADMIN — SODIUM CHLORIDE SCH UNITS: 4.5 INJECTION, SOLUTION INTRAVENOUS at 12:59

## 2022-11-17 VITALS — SYSTOLIC BLOOD PRESSURE: 118 MMHG | DIASTOLIC BLOOD PRESSURE: 70 MMHG

## 2022-11-17 VITALS — SYSTOLIC BLOOD PRESSURE: 145 MMHG | DIASTOLIC BLOOD PRESSURE: 75 MMHG

## 2022-11-17 VITALS — SYSTOLIC BLOOD PRESSURE: 129 MMHG | DIASTOLIC BLOOD PRESSURE: 63 MMHG

## 2022-11-17 VITALS — DIASTOLIC BLOOD PRESSURE: 73 MMHG | SYSTOLIC BLOOD PRESSURE: 144 MMHG

## 2022-11-17 VITALS — SYSTOLIC BLOOD PRESSURE: 128 MMHG | DIASTOLIC BLOOD PRESSURE: 62 MMHG

## 2022-11-17 VITALS — OXYGEN SATURATION: 93 %

## 2022-11-17 LAB
BACTERIA ID TEST ISLT QL CULT: (no result)
BACTERIA SPEC BFLD CULT: (no result)
BASOPHILS # BLD AUTO: 0.1 10^3/UL (ref 0–0.2)
BASOPHILS NFR BLD AUTO: 0.6 % (ref 0–1)
BUN SERPL-MCNC: 29 MG/DL (ref 9–23)
CALCIUM SERPL-MCNC: 9.1 MG/DL (ref 8.3–10.6)
CHLORIDE SERPL-SCNC: 105 MMOL/L (ref 98–107)
CO2 SERPL-SCNC: 20 MMOL/L (ref 20–31)
CREAT SERPL-MCNC: 1.34 MG/DL (ref 0.55–1.3)
EOSINOPHIL # BLD AUTO: 0.2 10^3/UL (ref 0–0.5)
EOSINOPHIL NFR BLD AUTO: 2.3 % (ref 0–3)
GFR SERPL CREATININE-BSD FRML MDRD: 39.9 ML/MIN/{1.73_M2} (ref 32–?)
GLUCOSE SERPL-MCNC: 91 MG/DL (ref 74–106)
HCT VFR BLD AUTO: 36.2 % (ref 36–47)
HGB BLD-MCNC: 11.4 G/DL (ref 12–15.5)
LYMPHOCYTES # BLD AUTO: 1.1 10^3/UL (ref 1.5–5)
LYMPHOCYTES NFR BLD AUTO: 12.9 % (ref 24–44)
MCH RBC QN AUTO: 29.5 PG (ref 27–33)
MCHC RBC AUTO-ENTMCNC: 31.5 G/DL (ref 32–36.5)
MCV RBC AUTO: 93.8 FL (ref 80–96)
MONOCYTES # BLD AUTO: 0.5 10^3/UL (ref 0–0.8)
MONOCYTES NFR BLD AUTO: 5.6 % (ref 2–8)
NEUTROPHILS # BLD AUTO: 6.8 10^3/UL (ref 1.5–8.5)
NEUTROPHILS NFR BLD AUTO: 78.3 % (ref 36–66)
PLATELET # BLD AUTO: 266 10^3/UL (ref 150–450)
POTASSIUM SERPL-SCNC: 4.5 MMOL/L (ref 3.5–5.1)
RBC # BLD AUTO: 3.86 10^6/UL (ref 4–5.4)
SODIUM SERPL-SCNC: 138 MMOL/L (ref 136–145)
WBC # BLD AUTO: 8.7 10^3/UL (ref 4–10)

## 2022-11-17 RX ADMIN — INSULIN LISPRO SCH UNITS: 100 INJECTION, SOLUTION INTRAVENOUS; SUBCUTANEOUS at 17:36

## 2022-11-17 RX ADMIN — PROBIOTIC PRODUCT - TAB SCH EA: TAB at 17:35

## 2022-11-17 RX ADMIN — ATORVASTATIN CALCIUM SCH MG: 20 TABLET, FILM COATED ORAL at 17:35

## 2022-11-17 RX ADMIN — SODIUM CHLORIDE SCH UNITS: 4.5 INJECTION, SOLUTION INTRAVENOUS at 13:49

## 2022-11-17 RX ADMIN — INSULIN DETEMIR SCH UNITS: 100 INJECTION, SOLUTION SUBCUTANEOUS at 08:58

## 2022-11-17 RX ADMIN — METOPROLOL SUCCINATE SCH MG: 25 TABLET, EXTENDED RELEASE ORAL at 17:35

## 2022-11-17 RX ADMIN — PROBIOTIC PRODUCT - TAB SCH EA: TAB at 08:56

## 2022-11-17 RX ADMIN — DOCUSATE SODIUM SCH MG: 100 CAPSULE, LIQUID FILLED ORAL at 08:56

## 2022-11-17 RX ADMIN — INSULIN LISPRO SCH UNITS: 100 INJECTION, SOLUTION INTRAVENOUS; SUBCUTANEOUS at 07:30

## 2022-11-17 RX ADMIN — SERTRALINE HYDROCHLORIDE SCH MG: 50 TABLET ORAL at 17:35

## 2022-11-17 RX ADMIN — INSULIN LISPRO SCH UNITS: 100 INJECTION, SOLUTION INTRAVENOUS; SUBCUTANEOUS at 13:14

## 2022-11-17 RX ADMIN — CLOPIDOGREL BISULFATE SCH MG: 75 TABLET ORAL at 17:35

## 2022-11-17 RX ADMIN — SODIUM CHLORIDE SCH UNITS: 4.5 INJECTION, SOLUTION INTRAVENOUS at 05:40

## 2022-11-17 RX ADMIN — DOCUSATE SODIUM SCH MG: 100 CAPSULE, LIQUID FILLED ORAL at 20:50

## 2022-11-17 RX ADMIN — SODIUM CHLORIDE SCH UNITS: 4.5 INJECTION, SOLUTION INTRAVENOUS at 20:49

## 2022-11-18 VITALS — SYSTOLIC BLOOD PRESSURE: 145 MMHG | DIASTOLIC BLOOD PRESSURE: 77 MMHG

## 2022-11-18 VITALS — OXYGEN SATURATION: 93 %

## 2022-11-18 LAB
BASOPHILS # BLD AUTO: 0.1 10^3/UL (ref 0–0.2)
BASOPHILS NFR BLD AUTO: 0.7 % (ref 0–1)
BUN SERPL-MCNC: 29 MG/DL (ref 9–23)
CALCIUM SERPL-MCNC: 8.9 MG/DL (ref 8.3–10.6)
CHLORIDE SERPL-SCNC: 103 MMOL/L (ref 98–107)
CO2 SERPL-SCNC: 24 MMOL/L (ref 20–31)
CREAT SERPL-MCNC: 1.46 MG/DL (ref 0.55–1.3)
EOSINOPHIL # BLD AUTO: 0.3 10^3/UL (ref 0–0.5)
EOSINOPHIL NFR BLD AUTO: 2.8 % (ref 0–3)
GFR SERPL CREATININE-BSD FRML MDRD: 36.2 ML/MIN/{1.73_M2} (ref 32–?)
GLUCOSE SERPL-MCNC: 87 MG/DL (ref 74–106)
HCT VFR BLD AUTO: 36.6 % (ref 36–47)
HGB BLD-MCNC: 11.6 G/DL (ref 12–15.5)
LYMPHOCYTES # BLD AUTO: 1.7 10^3/UL (ref 1.5–5)
LYMPHOCYTES NFR BLD AUTO: 15.6 % (ref 24–44)
MCH RBC QN AUTO: 30 PG (ref 27–33)
MCHC RBC AUTO-ENTMCNC: 31.7 G/DL (ref 32–36.5)
MCV RBC AUTO: 94.6 FL (ref 80–96)
MONOCYTES # BLD AUTO: 0.6 10^3/UL (ref 0–0.8)
MONOCYTES NFR BLD AUTO: 5.5 % (ref 2–8)
NEUTROPHILS # BLD AUTO: 8 10^3/UL (ref 1.5–8.5)
NEUTROPHILS NFR BLD AUTO: 75 % (ref 36–66)
PLATELET # BLD AUTO: 339 10^3/UL (ref 150–450)
POTASSIUM SERPL-SCNC: 4.5 MMOL/L (ref 3.5–5.1)
RBC # BLD AUTO: 3.87 10^6/UL (ref 4–5.4)
SODIUM SERPL-SCNC: 138 MMOL/L (ref 136–145)
WBC # BLD AUTO: 10.7 10^3/UL (ref 4–10)

## 2022-11-18 RX ADMIN — INSULIN LISPRO SCH UNITS: 100 INJECTION, SOLUTION INTRAVENOUS; SUBCUTANEOUS at 07:30

## 2022-11-18 RX ADMIN — SODIUM CHLORIDE SCH UNITS: 4.5 INJECTION, SOLUTION INTRAVENOUS at 05:50

## 2022-11-18 RX ADMIN — INSULIN DETEMIR SCH UNITS: 100 INJECTION, SOLUTION SUBCUTANEOUS at 08:43

## 2022-11-18 RX ADMIN — PROBIOTIC PRODUCT - TAB SCH EA: TAB at 08:43

## 2022-11-18 RX ADMIN — DOCUSATE SODIUM SCH MG: 100 CAPSULE, LIQUID FILLED ORAL at 08:43
